# Patient Record
Sex: FEMALE | Race: WHITE | NOT HISPANIC OR LATINO
[De-identification: names, ages, dates, MRNs, and addresses within clinical notes are randomized per-mention and may not be internally consistent; named-entity substitution may affect disease eponyms.]

---

## 2018-11-22 ENCOUNTER — TRANSCRIPTION ENCOUNTER (OUTPATIENT)
Age: 43
End: 2018-11-22

## 2019-04-03 ENCOUNTER — APPOINTMENT (OUTPATIENT)
Dept: BREAST CENTER | Facility: CLINIC | Age: 44
End: 2019-04-03
Payer: COMMERCIAL

## 2019-04-03 VITALS
BODY MASS INDEX: 24.29 KG/M2 | DIASTOLIC BLOOD PRESSURE: 68 MMHG | SYSTOLIC BLOOD PRESSURE: 95 MMHG | HEIGHT: 62 IN | WEIGHT: 132 LBS | HEART RATE: 65 BPM

## 2019-04-03 DIAGNOSIS — Z78.9 OTHER SPECIFIED HEALTH STATUS: ICD-10-CM

## 2019-04-03 DIAGNOSIS — Z87.898 PERSONAL HISTORY OF OTHER SPECIFIED CONDITIONS: ICD-10-CM

## 2019-04-03 DIAGNOSIS — Z87.410 PERSONAL HISTORY OF CERVICAL DYSPLASIA: ICD-10-CM

## 2019-04-03 DIAGNOSIS — Z87.891 PERSONAL HISTORY OF NICOTINE DEPENDENCE: ICD-10-CM

## 2019-04-03 DIAGNOSIS — Z80.1 FAMILY HISTORY OF MALIGNANT NEOPLASM OF TRACHEA, BRONCHUS AND LUNG: ICD-10-CM

## 2019-04-03 PROCEDURE — 99215 OFFICE O/P EST HI 40 MIN: CPT

## 2019-04-07 NOTE — PHYSICAL EXAM
[Normocephalic] : normocephalic [Atraumatic] : atraumatic [Supple] : supple [No Supraclavicular Adenopathy] : no supraclavicular adenopathy [Examined in the supine and seated position] : examined in the supine and seated position [No dominant masses] : no dominant masses in right breast  [No dominant masses] : no dominant masses left breast [No Nipple Retraction] : no left nipple retraction [No Nipple Discharge] : no left nipple discharge [No Axillary Lymphadenopathy] : no left axillary lymphadenopathy [No Edema] : no edema [No Rashes] : no rashes [No Ulceration] : no ulceration [de-identified] : Healed periareolar incision

## 2019-04-07 NOTE — ASSESSMENT
[FreeTextEntry1] : This is a 43-year-old high-risk female\par She will have a left breast 3:00 ultrasound-guided biopsy\par She is due for bilateral breast MRI April 2019\par Plan bilateral mammogram and ultrasound October 2019\par If pathology is benign and concordant in followup in October after screening imaging\par We reviewed risk reduction strategies including maintaining a BMI <25, limiting red meat intake and alcoholic beverages to 3 per week and exercise (150 min/ week low intensity or 75 min/week high intensity). And maintaining a normal vitamin D level.\par \par She knows to call or return sooner should any concerns or questions arise.\par

## 2019-04-07 NOTE — PHYSICAL EXAM
[Normocephalic] : normocephalic [Atraumatic] : atraumatic [Supple] : supple [No Supraclavicular Adenopathy] : no supraclavicular adenopathy [Examined in the supine and seated position] : examined in the supine and seated position [No dominant masses] : no dominant masses in right breast  [No dominant masses] : no dominant masses left breast [No Nipple Retraction] : no left nipple retraction [No Nipple Discharge] : no left nipple discharge [No Axillary Lymphadenopathy] : no left axillary lymphadenopathy [No Edema] : no edema [No Rashes] : no rashes [No Ulceration] : no ulceration [de-identified] : Healed periareolar incision

## 2019-04-07 NOTE — HISTORY OF PRESENT ILLNESS
[FreeTextEntry1] :  This is a 42 year old female followed for high risk. She is s/p left stereotactic biopsy on 10/20/2016 done for increasing microcalcifications of the left breast lower outer quadrant. Pathology showed adenosis, sclerosing adenosis and involuting adenosis with scattered luminal calcifications. Path was found to be concordant and a f/u 6 month mammogram was recommended.  She is s/p left excisional biopsy in 11/2015. Path showed PASH and benign breast tissue. She has a history of bilateral excisional biopsies for fibroadenomas and a additional two core biopsies  of the left breast in 2002 and 2013.  Both areas were benign. She stated she first felt this lump on 9/2/15 and had negative imaging. She then had a breast MRI which showed an area at 10:00 of "suspicious enhancement" for which targeted ultrasound was recommended. This was done at [place] and a complicated cyst was found to correlate with the palpable finding and she was told to follow up in 6 months for ultrasound. On further workup, at left breast 10:00 2cm FN a circumscribed hypoechoic lesion correlates with the enhancing lesion seen on MRI at 10:00. IN addition at 7-8:00 in left breast 3cm FN adjacent to where the patient felt a lump a hypoechoic lesion with indistinct margins was seen and biopsy was recommended. The patient is s/p left 10:00 N2 USGBx 10/22/15, pathology shows nodular adenosis, with no atypia nor malignancy. At left -8:00 N3 pathology shows cystic change with associated papillary apocrine hyperplasia, no atypia nor malignancy. Patient is s/p left EBBx 11/12/15 for a palpable mass that was not seen on ultrasound. 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 \par \par Patient is s/p hip labrum tear repair on 09/29/2017. Patient presents today for a new right breast palpable mass. Patient states she felt on 01/03/2018. \par \par She does not SBE. \par She has not noticed a change in her breast or a breast lump on the right.\par She has not noticed a change in her nipple or nipple area.\par She has not noticed a change in the skin of the breast.\par She is not experiencing nipple discharge.\par She is not experiencing breast pain.\par She has not noticed a lump or lymph node under the armpit. \par \par BREAST CANCER RISK FACTORS\par Menarche: 9\par Date of LMP: 12/18/2017\par Menopause: pre\par Grav: 2  Para:  2\par Age at first live birth: 32\par Nursed: no\par Hysterectomy: no\par Oophorectomy: no\par OCP: yes\par HRT: no\par Last pap/pelvic exam: 05/2017 WNL\par Related family history: no\par Ashkenazi: no\par Mastery: BRCAPRO 10.7%, TCv6 10.8%, TCv7 26.3%, Sarah 28.8%\par BRCA testing: no\par Bra size:  36B\par \par Last mammogram:    09/12/2017        Location: [place]\par Report reviewed.                                 Images reviewed on PACS.\par Results: Birads 2\par Waxing and waning cysts clusters. Heterogeneously dense breast. No evidence of malignancy. \par \par \par Last ultrasound:   09/12/2017        Location: [place]\par Report reviewed.                                 Images reviewed on PACS.\par Results: Birads 2\par Waxing and waning cysts clusters. Heterogeneously dense breast. No evidence of malignancy. \par \par \par Last MRI:      11/22/2016                           Location: [facility] \par Report reviewed.                                     Images reviewed on PACS.\par Results: Birads 2\par No evidence of malignancy

## 2019-04-07 NOTE — HISTORY OF PRESENT ILLNESS
[FreeTextEntry1] :  This is a 42 year old female followed for high risk. She is s/p left stereotactic biopsy on 10/20/2016 done for increasing microcalcifications of the left breast lower outer quadrant. Pathology showed adenosis, sclerosing adenosis and involuting adenosis with scattered luminal calcifications. Path was found to be concordant and a f/u 6 month mammogram was recommended.  She is s/p left excisional biopsy in 11/2015. Path showed PASH and benign breast tissue. She has a history of bilateral excisional biopsies for fibroadenomas and a additional two core biopsies  of the left breast in 2002 and 2013.  Both areas were benign. She stated she first felt this lump on 9/2/15 and had negative imaging. She then had a breast MRI which showed an area at 10:00 of "suspicious enhancement" for which targeted ultrasound was recommended. This was done at [place] and a complicated cyst was found to correlate with the palpable finding and she was told to follow up in 6 months for ultrasound. On further workup, at left breast 10:00 2cm FN a circumscribed hypoechoic lesion correlates with the enhancing lesion seen on MRI at 10:00. IN addition at 7-8:00 in left breast 3cm FN adjacent to where the patient felt a lump a hypoechoic lesion with indistinct margins was seen and biopsy was recommended. The patient is s/p left 10:00 N2 USGBx 10/22/15, pathology shows nodular adenosis, with no atypia nor malignancy. At left -8:00 N3 pathology shows cystic change with associated papillary apocrine hyperplasia, no atypia nor malignancy. Patient is s/p left EBBx 11/12/15 for a palpable mass that was not seen on ultrasound. 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 \par \par Patient is s/p hip labrum tear repair on 09/29/2017. Patient presents today for a new right breast palpable mass. Patient states she felt on 01/03/2018. \par \par She does not SBE. \par She has not noticed a change in her breast or a breast lump on the right.\par She has not noticed a change in her nipple or nipple area.\par She has not noticed a change in the skin of the breast.\par She is not experiencing nipple discharge.\par She is not experiencing breast pain.\par She has not noticed a lump or lymph node under the armpit. \par \par BREAST CANCER RISK FACTORS\par Menarche: 9\par Date of LMP: 12/18/2017\par Menopause: pre\par Grav: 2  Para:  2\par Age at first live birth: 32\par Nursed: no\par Hysterectomy: no\par Oophorectomy: no\par OCP: yes\par HRT: no\par Last pap/pelvic exam: 05/2017 WNL\par Related family history: no\par Ashkenazi: no\par Mastery: BRCAPRO 10.7%, TCv6 10.8%, TCv7 26.3%, Sarah 28.8%\par BRCA testing: no\par Bra size:  36B\par \par Last mammogram:    09/12/2017        Location: [place]\par Report reviewed.                                 Images reviewed on PACS.\par Results: Birads 2\par Waxing and waning cysts clusters. Heterogeneously dense breast. No evidence of malignancy. \par \par \par Last ultrasound:   09/12/2017        Location: [place]\par Report reviewed.                                 Images reviewed on PACS.\par Results: Birads 2\par Waxing and waning cysts clusters. Heterogeneously dense breast. No evidence of malignancy. \par \par \par Last MRI:      11/22/2016                           Location: [facility] [place] \par Report reviewed.                                     Images reviewed on PACS.\par Results: Birads 2\par No evidence of malignancy

## 2019-04-07 NOTE — REVIEW OF SYSTEMS
[Fever] : fever [Chills] : chills [Recent Weight Loss (___ Lbs)] : recent [unfilled] ~Ulb weight loss [Night Sweats] : night sweats [Abdominal Pain] : abdominal pain [Vomiting] : vomiting [Joint Pain] : joint pain [Lower Back Pain] : lower back pain [Hot Flashes] : hot flashes [Pain During [place]] : pain during intercourse [Easy Bruising] : a tendency for easy bruising [Negative] : Psychiatric [FreeTextEntry7] : loss of appetite [FreeTextEntry2] : abnormal pap smear

## 2019-04-07 NOTE — REVIEW OF SYSTEMS
[Fever] : fever [Chills] : chills [Recent Weight Loss (___ Lbs)] : recent [unfilled] ~Ulb weight loss [Night Sweats] : night sweats [Abdominal Pain] : abdominal pain [Vomiting] : vomiting [Joint Pain] : joint pain [Lower Back Pain] : lower back pain [Hot Flashes] : hot flashes [Pain During [address]] : pain during intercourse [Easy Bruising] : a tendency for easy bruising [Negative] : Psychiatric [FreeTextEntry7] : loss of appetite [FreeTextEntry2] : abnormal pap smear

## 2019-04-08 ENCOUNTER — APPOINTMENT (OUTPATIENT)
Dept: BREAST CENTER | Facility: CLINIC | Age: 44
End: 2019-04-08

## 2019-04-12 ENCOUNTER — RESULT REVIEW (OUTPATIENT)
Age: 44
End: 2019-04-12

## 2019-04-25 ENCOUNTER — CHART COPY (OUTPATIENT)
Age: 44
End: 2019-04-25

## 2019-04-26 ENCOUNTER — CLINICAL ADVICE (OUTPATIENT)
Age: 44
End: 2019-04-26

## 2019-05-08 ENCOUNTER — CLINICAL ADVICE (OUTPATIENT)
Age: 44
End: 2019-05-08

## 2019-05-09 NOTE — REVIEW OF SYSTEMS
[Fever] : fever [Chills] : chills [Recent Weight Loss (___ Lbs)] : recent [unfilled] ~Ulb weight loss [Night Sweats] : night sweats [Abdominal Pain] : abdominal pain [Vomiting] : vomiting [Joint Pain] : joint pain [Lower Back Pain] : lower back pain [Hot Flashes] : hot flashes [Pain During [place]] : pain during intercourse [Easy Bruising] : a tendency for easy bruising [Negative] : Psychiatric [FreeTextEntry2] : abnormal pap smear [FreeTextEntry7] : loss of appetite

## 2019-05-09 NOTE — HISTORY OF PRESENT ILLNESS
[FreeTextEntry1] :  This is a 43 year old female followed for high risk. She is s/p left stereotactic biopsy on 10/20/2016 done for increasing microcalcifications of the left breast lower outer quadrant. Pathology showed adenosis, sclerosing adenosis and involuting adenosis with scattered luminal calcifications. Path was found to be concordant and a f/u 6 month mammogram was recommended.  She is s/p left excisional biopsy in 11/2015. Path showed PASH and benign breast tissue. She has a history of bilateral excisional biopsies for fibroadenomas and a additional two core biopsies  of the left breast in 2002 and 2013.  Both areas were benign. She stated she first felt this lump on 9/2/15 and had negative imaging. She then had a breast MRI which showed an area at 10:00 of "suspicious enhancement" for which targeted ultrasound was recommended. This was done at [place] and a complicated cyst was found to correlate with the palpable finding and she was told to follow up in 6 months for ultrasound. On further workup, at left breast 10:00 2cm FN a circumscribed hypoechoic lesion correlates with the enhancing lesion seen on MRI at 10:00. IN addition at 7-8:00 in left breast 3cm FN adjacent to where the patient felt a lump a hypoechoic lesion with indistinct margins was seen and biopsy was recommended. The patient is s/p left 10:00 N2 USGBx 10/22/15, pathology shows nodular adenosis, with no atypia nor malignancy. At left -8:00 N3 pathology shows cystic change with associated papillary apocrine hyperplasia, no atypia nor malignancy. Patient is s/p left EBBx 11/12/15 for a palpable mass that was not seen on ultrasound. 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 Patient is s/p hip labrum tear repair on 09/29/2017. \par \par Her father passed away in 2017. Her 7-year-old son was recently diagnosed with a rare neurologic condition. She is feeling under a lot of stress. She had breast imaging today. And a biopsy is being recommended for a new hypoechoic lesion of the left breast. She will also be due for screening breast MRI in April.\par She does not SBE. \par She has not noticed a change in her breast or a breast lump on the right.\par She has not noticed a change in her nipple or nipple area.\par She has not noticed a change in the skin of the breast.\par She is not experiencing nipple discharge.\par She is not experiencing breast pain. She is experiencing tenderness to the touch with her left breast\par She has not noticed a lump or lymph node under the armpit. \par \par BREAST CANCER RISK FACTORS\par Menarche: 9\par Date of LMP: 2/15/2019\par Menopause: pre\par Grav: 2  Para:  2\par Age at first live birth: 32\par Nursed: no\par Hysterectomy: yes 3/5/2019\par Oophorectomy: no\par OCP: yes\par HRT: no\par Last pap/pelvic exam: 11/2018. abnormal\par Related family history: no\par Ashkenazi: no\par Mastery: BRCAPRO 11.2%, TCv6 10.2%, TCv7 25.0%, Sarah 22.0%, [facility] is not applicable. \par BRCA testing: no\par Bra size:  36B\par \par Last mammogram:    09/12/2017        Location: [place]\par Report reviewed.                                 Images reviewed on PACS.\par Results: Birads 2\par Waxing and waning cysts clusters. Heterogeneously dense breast. No evidence of malignancy. \par \par \par Last ultrasound:   4/3/2019(left)        Location: [place]\par Report reviewed.                                 Images reviewed on PACS.\par Results: Birads 4a Left breast 3:00 retroareolar there is a hypoechoic lesion that is not significantly changed in size but there is new color flow anteriorly. Recommend ultrasound-guided biopsy BI-RADS 4A\par \par \par Last MRI:      2018                           Location: [facility] \par Report reviewed.                                     Images reviewed on PACS.\par Results: Birads 2\par No evidence of malignancy

## 2019-05-09 NOTE — PHYSICAL EXAM
[Normocephalic] : normocephalic [Atraumatic] : atraumatic [Supple] : supple [No Supraclavicular Adenopathy] : no supraclavicular adenopathy [Examined in the supine and seated position] : examined in the supine and seated position [No dominant masses] : no dominant masses left breast [No dominant masses] : no dominant masses in right breast  [No Nipple Retraction] : no left nipple retraction [No Nipple Discharge] : no left nipple discharge [No Axillary Lymphadenopathy] : no left axillary lymphadenopathy [No Edema] : no edema [No Rashes] : no rashes [No Ulceration] : no ulceration [de-identified] : Healed periareolar incision

## 2019-05-10 ENCOUNTER — APPOINTMENT (OUTPATIENT)
Dept: BREAST CENTER | Facility: CLINIC | Age: 44
End: 2019-05-10
Payer: COMMERCIAL

## 2019-05-10 VITALS
HEART RATE: 72 BPM | BODY MASS INDEX: 23.74 KG/M2 | HEIGHT: 62 IN | WEIGHT: 129 LBS | DIASTOLIC BLOOD PRESSURE: 69 MMHG | SYSTOLIC BLOOD PRESSURE: 98 MMHG

## 2019-05-10 PROCEDURE — 99215 OFFICE O/P EST HI 40 MIN: CPT

## 2019-05-10 NOTE — HISTORY OF PRESENT ILLNESS
[FreeTextEntry1] : This is a 43 year old female followed for high risk. She is s/p left stereotactic biopsy on 10/20/2016 done for increasing microcalcifications of the left breast lower outer quadrant. Pathology showed adenosis, sclerosing adenosis and involuting adenosis with scattered luminal calcifications. Path was found to be concordant and a f/u 6 month mammogram was recommended.  She is s/p left excisional biopsy in 11/2015. Path showed PASH and benign breast tissue. She has a history of bilateral excisional biopsies for fibroadenomas and a additional two core biopsies  of the left breast in 2002 and 2013.  Both areas were benign. She stated she first felt this lump on 9/2/15 and had negative imaging. She then had a breast MRI which showed an area at 10:00 of "suspicious enhancement" for which targeted ultrasound was recommended. This was done at [place] and a complicated cyst was found to correlate with the palpable finding and she was told to follow up in 6 months for ultrasound. On further workup, at left breast 10:00 2cm FN a circumscribed hypoechoic lesion correlates with the enhancing lesion seen on MRI at 10:00. IN addition at 7-8:00 in left breast 3cm FN adjacent to where the patient felt a lump a hypoechoic lesion with indistinct margins was seen and biopsy was recommended. The patient is s/p left 10:00 N2 USGBx 10/22/15, pathology shows nodular adenosis, with no atypia nor malignancy. At left -8:00 N3 pathology shows cystic change with associated papillary apocrine hyperplasia, no atypia nor malignancy. Patient is s/p left EBBx 11/12/15 for a palpable mass that was not seen on ultrasound. 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 \par \par Patient is s/p hip labrum tear repair on 09/29/2017. She is status post hysterectomy. On 4/9/19 she underwent left breast ultrasound-guided biopsy pathology showed benign intraductal papilloma with you D. age. Transected on one side. No atypia no malignancy.She then had screening breast MRI. On 4/25/19 she had left breast 10:00 ultrasound-guided biopsy which revealed benign results. And this was on an area as seen on her MRI with enhancement.On 5/6/19 she underwent right breast 7:00 MRI guided biopsy which had benign findings. And PAS H. and MRI biopsy right breast in upper inner quadrant which showed benign breast tissue and PAS H.\par \par \par She does not SBE. \par She has noticed a change in her right breast or a breast lump on the right. Painful bruise on right breast.\par She has not noticed a change in her nipple or nipple area.\par She has noticed a change in the skin painful bruising of the right breast.\par She is not experiencing nipple discharge.\par She is experiencing right breast pain.\par She has not noticed a lump or lymph node under the armpit. \par \par BREAST CANCER RISK FACTORS\par Menarche: 9\par Date of LMP: 2/15/2019\par Menopause: pre\par Grav: 2  Para:  2\par Age at first live birth: 32\par Nursed: no\par Hysterectomy: yes 3/5/2019\par Oophorectomy: no\par OCP: yes\par HRT: no\par Last pap/pelvic exam: 4/2019 WNL.\par Related family history: no\par Ashkenazi: no\par Mastery: BRCAPRO 10.7%, TCv6 10.8%, TCv7 26.3%, Sarah 28.8%\par BRCA testing: no\par Bra size:  36B\par \par Last mammogram:    4/25/2019        Location: [place]\par Report reviewed.                                 Images reviewed on PACS.\par Results: Birads 4\par \par \par Last ultrasound:   04/25/2019        Location: [place]\par Report reviewed.                                 Images reviewed on PACS.\par Results: Birads 4\par \par \par Last MRI:   4/23/2019                           Location: [facility] \par Report reviewed.                                     Images reviewed on PACS.\par Results: Birads 4aLeft breast 3:00 postbiopsy change and small enhancing mass with washout kinetic posterior and lateral to the marker. May repair present residual papilloma. Left breast 10:00 there is an enhancing mass with washout kinetic. 7:00 in the right breast there is a nodular rim enhancing mass with washout kinetic. MRI directed ultrasound is recommended with possible biopsy or MRI biopsy.\par

## 2019-05-10 NOTE — PHYSICAL EXAM
[Atraumatic] : atraumatic [Normocephalic] : normocephalic [Supple] : supple [No Supraclavicular Adenopathy] : no supraclavicular adenopathy [Examined in the supine and seated position] : examined in the supine and seated position [Symmetrical] : symmetrical [No dominant masses] : no dominant masses in right breast  [No dominant masses] : no dominant masses left breast [No Nipple Retraction] : no left nipple retraction [No Nipple Discharge] : no left nipple discharge [No Axillary Lymphadenopathy] : no left axillary lymphadenopathy [No Edema] : no edema [No Rashes] : no rashes [No Ulceration] : no ulceration [de-identified] : uiq BRUISING  with associate hardness as expected; Steri-Strips removed

## 2019-05-10 NOTE — ASSESSMENT
[FreeTextEntry1] : left breast 3:00 papilloma\par right breast benign pathology\par arnica topically\par genetic testing referral\par She will have genetic testing and then decide what surgery she would like she will need left breast excisional biopsy 3:00 for her biopsy-proven papilloma.\par She knows to call or return sooner should any concerns or questions arise.\par

## 2019-05-31 ENCOUNTER — APPOINTMENT (OUTPATIENT)
Dept: BREAST CENTER | Facility: CLINIC | Age: 44
End: 2019-05-31
Payer: COMMERCIAL

## 2019-05-31 VITALS
BODY MASS INDEX: 23.55 KG/M2 | DIASTOLIC BLOOD PRESSURE: 67 MMHG | HEART RATE: 63 BPM | WEIGHT: 128 LBS | HEIGHT: 62 IN | SYSTOLIC BLOOD PRESSURE: 107 MMHG

## 2019-05-31 PROCEDURE — 99215 OFFICE O/P EST HI 40 MIN: CPT

## 2019-06-06 ENCOUNTER — APPOINTMENT (OUTPATIENT)
Dept: BREAST CENTER | Facility: HOSPITAL | Age: 44
End: 2019-06-06
Payer: COMMERCIAL

## 2019-06-06 PROCEDURE — 76098 X-RAY EXAM SURGICAL SPECIMEN: CPT | Mod: 26,59

## 2019-06-06 PROCEDURE — 14001 TIS TRNFR TRUNK 10.1-30SQCM: CPT

## 2019-06-06 PROCEDURE — 19125 EXCISION BREAST LESION: CPT | Mod: LT,59

## 2019-06-10 ENCOUNTER — TRANSCRIPTION ENCOUNTER (OUTPATIENT)
Age: 44
End: 2019-06-10

## 2019-06-14 ENCOUNTER — APPOINTMENT (OUTPATIENT)
Dept: BREAST CENTER | Facility: CLINIC | Age: 44
End: 2019-06-14
Payer: COMMERCIAL

## 2019-06-14 PROCEDURE — 99024 POSTOP FOLLOW-UP VISIT: CPT

## 2019-06-14 NOTE — HISTORY OF PRESENT ILLNESS
[FreeTextEntry1] : This is a 43 year old female followed for high risk. She is s/p left stereotactic biopsy on 10/20/2016 done for increasing microcalcifications of the left breast lower outer quadrant. Pathology showed adenosis, sclerosing adenosis and involuting adenosis with scattered luminal calcifications. Path was found to be concordant and a f/u 6 month mammogram was recommended.  She is s/p left excisional biopsy in 11/2015. Path showed PASH and benign breast tissue. She has a history of bilateral excisional biopsies for fibroadenomas and a additional two core biopsies  of the left breast in 2002 and 2013.  Both areas were benign. She stated she first felt this lump on 9/2/15 and had negative imaging. She then had a breast MRI which showed an area at 10:00 of "suspicious enhancement" for which targeted ultrasound was recommended. This was done at [place] and a complicated cyst was found to correlate with the palpable finding and she was told to follow up in 6 months for ultrasound. On further workup, at left breast 10:00 2cm FN a circumscribed hypoechoic lesion correlates with the enhancing lesion seen on MRI at 10:00. IN addition at 7-8:00 in left breast 3cm FN adjacent to where the patient felt a lump a hypoechoic lesion with indistinct margins was seen and biopsy was recommended. The patient is s/p left 10:00 N2 USGBx 10/22/15, pathology shows nodular adenosis, with no atypia nor malignancy. At left -8:00 N3 pathology shows cystic change with associated papillary apocrine hyperplasia, no atypia nor malignancy. Patient is s/p left EBBx 11/12/15 for a palpable mass that was not seen on ultrasound. 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 \par \par Patient is s/p hip labrum tear repair on 09/29/2017. She is status post hysterectomy. On 4/9/19 she underwent left breast ultrasound-guided biopsy pathology showed benign intraductal papilloma with you D. age. Transected on one side. No atypia no malignancy.She then had screening breast MRI. On 4/25/19 she had left breast 10:00 ultrasound-guided biopsy which revealed benign results. And this was on an area as seen on her MRI with enhancement.On 5/6/19 she underwent right breast 7:00 MRI guided biopsy which had benign findings. And PAS H. and MRI biopsy right breast in upper inner quadrant which showed benign breast tissue and PASH.\par \par C/o right breast burning, lump, denies trauma and has been doing warm washcloths with some relief. \par \par Status post left breast 3:00 excisional biopsy 6/6/19 pathology shows benign breast tissue with usual ductal hyperplasia, biopsy change present, no residual papilloma identified.She has no  breast complaints today.\par \par \par She does not SBE. \par She has noticed a change in her right breast or a breast lump on the right. Painful bruise on right breast.\par She has not noticed a change in her nipple or nipple area.\par She has noticed a change in the skin painful bruising of the right breast.\par She is not experiencing nipple discharge.\par She is experiencing right breast pain.\par She has not noticed a lump or lymph node under the armpit. \par \par BREAST CANCER RISK FACTORS\par Menarche: 9\par Date of LMP: 2/15/2019\par Menopause: pre\par Grav: 2  Para:  2\par Age at first live birth: 32\par Nursed: no\par Hysterectomy: yes 3/5/2019\par Oophorectomy: no\par OCP: yes\par HRT: no\par Last pap/pelvic exam: 4/2019 WNL.\par Related family history: no\par Ashkenazi: no\par Mastery: BRCAPRO 10.7%, TCv6 10.8%, TCv7 26.3%, Sarah 28.8%\par BRCA testing: yes negative\par Bra size:  36B\par \par Last mammogram:    4/25/2019        Location: [place]\par Report reviewed.                                 Images reviewed on PACS.\par Results: Birads 4\par \par \par Last ultrasound:   04/25/2019        Location: [place]\par Report reviewed.                                 Images reviewed on PACS.\par Results: Birads 4\par \par \par Last MRI:   4/23/2019                           Location: [facility] \par Report reviewed.                                     Images reviewed on PACS.\par Results: Birads 4aLeft breast 3:00 postbiopsy change and small enhancing mass with washout kinetic posterior and lateral to the marker. May repair present residual papilloma. Left breast 10:00 there is an enhancing mass with washout kinetic. 7:00 in the right breast there is a nodular rim enhancing mass with washout kinetic. MRI directed ultrasound is recommended with possible biopsy or MRI biopsy.\par

## 2019-06-14 NOTE — ASSESSMENT
[FreeTextEntry1] : doing well post op, pathology reviewed, copy given\par f/u1 month to ensure dermabond removal\par plan mg/sono OCT 2019\par plan left us 10N2 OCT 2019\par plan 6 month follow up MRI NOV 2019\par She knows to call or return sooner should any concerns or questions arise.\par

## 2019-06-14 NOTE — HISTORY OF PRESENT ILLNESS
[FreeTextEntry1] : This is a 43 year old female followed for high risk. She is s/p left stereotactic biopsy on 10/20/2016 done for increasing microcalcifications of the left breast lower outer quadrant. Pathology showed adenosis, sclerosing adenosis and involuting adenosis with scattered luminal calcifications. Path was found to be concordant and a f/u 6 month mammogram was recommended.  She is s/p left excisional biopsy in 11/2015. Path showed PASH and benign breast tissue. She has a history of bilateral excisional biopsies for fibroadenomas and a additional two core biopsies  of the left breast in 2002 and 2013.  Both areas were benign. She stated she first felt this lump on 9/2/15 and had negative imaging. She then had a breast MRI which showed an area at 10:00 of "suspicious enhancement" for which targeted ultrasound was recommended. This was done at [place] and a complicated cyst was found to correlate with the palpable finding and she was told to follow up in 6 months for ultrasound. On further workup, at left breast 10:00 2cm FN a circumscribed hypoechoic lesion correlates with the enhancing lesion seen on MRI at 10:00. IN addition at 7-8:00 in left breast 3cm FN adjacent to where the patient felt a lump a hypoechoic lesion with indistinct margins was seen and biopsy was recommended. The patient is s/p left 10:00 N2 USGBx 10/22/15, pathology shows nodular adenosis, with no atypia nor malignancy. At left -8:00 N3 pathology shows cystic change with associated papillary apocrine hyperplasia, no atypia nor malignancy. Patient is s/p left EBBx 11/12/15 for a palpable mass that was not seen on ultrasound. 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 \par \par Patient is s/p hip labrum tear repair on 09/29/2017. She is status post hysterectomy. On 4/9/19 she underwent left breast ultrasound-guided biopsy pathology showed benign intraductal papilloma with you D. age. Transected on one side. No atypia no malignancy.She then had screening breast MRI. On 4/25/19 she had left breast 10:00 ultrasound-guided biopsy which revealed benign results. And this was on an area as seen on her MRI with enhancement.On 5/6/19 she underwent right breast 7:00 MRI guided biopsy which had benign findings. And PAS H. and MRI biopsy right breast in upper inner quadrant which showed benign breast tissue and PASH.\par \par C/o right breast burning, lump, denies trauma and has been doing warm washcloths with some relief. \par \par Status post left breast 3:00 excisional biopsy 6/6/19 pathology shows benign breast tissue with usual ductal hyperplasia, biopsy change present, no residual papilloma identified.She has no  breast complaints today.\par \par \par She does not SBE. \par She has noticed a change in her right breast or a breast lump on the right. Painful bruise on right breast.\par She has not noticed a change in her nipple or nipple area.\par She has noticed a change in the skin painful bruising of the right breast.\par She is not experiencing nipple discharge.\par She is experiencing right breast pain.\par She has not noticed a lump or lymph node under the armpit. \par \par BREAST CANCER RISK FACTORS\par Menarche: 9\par Date of LMP: 2/15/2019\par Menopause: pre\par Grav: 2  Para:  2\par Age at first live birth: 32\par Nursed: no\par Hysterectomy: yes 3/5/2019\par Oophorectomy: no\par OCP: yes\par HRT: no\par Last pap/pelvic exam: 4/2019 WNL.\par Related family history: no\par Ashkenazi: no\par Mastery: BRCAPRO 10.7%, TCv6 10.8%, TCv7 26.3%, Sarah 28.8%\par BRCA testing: yes negative\par Bra size:  36B\par \par Last mammogram:    4/25/2019        Location: [place]\par Report reviewed.                                 Images reviewed on PACS.\par Results: Birads 4\par \par \par Last ultrasound:   04/25/2019        Location: [place]\par Report reviewed.                                 Images reviewed on PACS.\par Results: Birads 4\par \par \par Last MRI:   4/23/2019                           Location: [facility] \par Report reviewed.                                     Images reviewed on PACS.\par Results: Birads 4aLeft breast 3:00 postbiopsy change and small enhancing mass with washout kinetic posterior and lateral to the marker. May repair present residual papilloma. Left breast 10:00 there is an enhancing mass with washout kinetic. 7:00 in the right breast there is a nodular rim enhancing mass with washout kinetic. MRI directed ultrasound is recommended with possible biopsy or MRI biopsy.\par

## 2019-06-14 NOTE — PHYSICAL EXAM
[Atraumatic] : atraumatic [Normocephalic] : normocephalic [Supple] : supple [No Supraclavicular Adenopathy] : no supraclavicular adenopathy [Examined in the supine and seated position] : examined in the supine and seated position [Symmetrical] : symmetrical [No dominant masses] : no dominant masses in right breast  [No Nipple Retraction] : no right nipple retraction [No dominant masses] : no dominant masses left breast [No Nipple Discharge] : no right nipple discharge [No Axillary Lymphadenopathy] : no left axillary lymphadenopathy [No Edema] : no edema [No Rashes] : no rashes [No Ulceration] : no ulceration [de-identified] : right breast UOQ hematoma palpable in area of patient's concern, no nipple abnormalities improved since prior [de-identified] : healed periareolar incision , dermabond intact, no collections

## 2019-06-14 NOTE — ASSESSMENT
[FreeTextEntry1] : doing well\par path reviewed, she has received a copy \par f/u  1 month to ensure dermabond removed\par plan bl mg/sono OCT 2019\par f/u MRI NOV 2019\par left targeted us 10N2\par She knows to call or return sooner should any concerns or questions arise.\par

## 2019-06-14 NOTE — PHYSICAL EXAM
[Normocephalic] : normocephalic [Atraumatic] : atraumatic [Supple] : supple [No Supraclavicular Adenopathy] : no supraclavicular adenopathy [Examined in the supine and seated position] : examined in the supine and seated position [Symmetrical] : symmetrical [No dominant masses] : no dominant masses in right breast  [No dominant masses] : no dominant masses left breast [No Nipple Retraction] : no left nipple retraction [No Nipple Discharge] : no left nipple discharge [No Axillary Lymphadenopathy] : no left axillary lymphadenopathy [No Edema] : no edema [No Rashes] : no rashes [No Ulceration] : no ulceration [de-identified] : right breast UOQ hematoma palpable in area of patient's concern, no nipple abnormalities improved since prior [de-identified] : healed periareolar incision , dermabond intact, no collections

## 2019-07-10 ENCOUNTER — APPOINTMENT (OUTPATIENT)
Dept: BREAST CENTER | Facility: CLINIC | Age: 44
End: 2019-07-10

## 2019-07-17 ENCOUNTER — APPOINTMENT (OUTPATIENT)
Dept: BREAST CENTER | Facility: CLINIC | Age: 44
End: 2019-07-17
Payer: COMMERCIAL

## 2019-07-17 PROCEDURE — 99024 POSTOP FOLLOW-UP VISIT: CPT

## 2019-07-17 NOTE — HISTORY OF PRESENT ILLNESS
[FreeTextEntry1] : This is a 43 year old female followed for high risk. She is s/p left stereotactic biopsy on 10/20/2016 done for increasing microcalcifications of the left breast lower outer quadrant. Pathology showed adenosis, sclerosing adenosis and involuting adenosis with scattered luminal calcifications. Path was found to be concordant and a f/u 6 month mammogram was recommended.  She is s/p left excisional biopsy in 11/2015. Path showed PASH and benign breast tissue. She has a history of bilateral excisional biopsies for fibroadenomas and a additional two core biopsies  of the left breast in 2002 and 2013.  Both areas were benign. She stated she first felt this lump on 9/2/15 and had negative imaging. She then had a breast MRI which showed an area at 10:00 of "suspicious enhancement" for which targeted ultrasound was recommended. This was done at [place] and a complicated cyst was found to correlate with the palpable finding and she was told to follow up in 6 months for ultrasound. On further workup, at left breast 10:00 2cm FN a circumscribed hypoechoic lesion correlates with the enhancing lesion seen on MRI at 10:00. IN addition at 7-8:00 in left breast 3cm FN adjacent to where the patient felt a lump a hypoechoic lesion with indistinct margins was seen and biopsy was recommended. The patient is s/p left 10:00 N2 USGBx 10/22/15, pathology shows nodular adenosis, with no atypia nor malignancy. At left -8:00 N3 pathology shows cystic change with associated papillary apocrine hyperplasia, no atypia nor malignancy. Patient is s/p left EBBx 11/12/15 for a palpable mass that was not seen on ultrasound. 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 \par \par Patient is s/p hip labrum tear repair on 09/29/2017. She is status post hysterectomy. On 4/9/19 she underwent left breast ultrasound-guided biopsy pathology showed benign intraductal papilloma with you D. age. Transected on one side. No atypia no malignancy.She then had screening breast MRI. On 4/25/19 she had left breast 10:00 ultrasound-guided biopsy which revealed benign results. And this was on an area as seen on her MRI with enhancement.On 5/6/19 she underwent right breast 7:00 MRI guided biopsy which had benign findings. And PAS H. and MRI biopsy right breast in upper inner quadrant which showed benign breast tissue and PASH.\par \par C/o right breast burning, lump, denies trauma and has been doing warm washcloths with some relief. \par \par Status post left breast 3:00 excisional biopsy 6/6/19 pathology shows benign breast tissue with usual ductal hyperplasia, biopsy change present, no residual papilloma identified.She has no  breast complaints today.\par \par \par She does not SBE. \par She has noticed a change in her right breast or a breast lump on the right. Painful bruise on right breast.\par She has not noticed a change in her nipple or nipple area.\par She has noticed a change in the skin painful bruising of the right breast.\par She is not experiencing nipple discharge.\par She is experiencing right breast pain.\par She has not noticed a lump or lymph node under the armpit. \par \par BREAST CANCER RISK FACTORS\par Menarche: 9\par Date of LMP: 2/15/2019\par Menopause: pre\par Grav: 2  Para:  2\par Age at first live birth: 32\par Nursed: no\par Hysterectomy: yes 3/5/2019\par Oophorectomy: no\par OCP: yes\par HRT: no\par Last pap/pelvic exam: 4/2019 WNL.\par Related family history: no\par Ashkenazi: no\par Mastery: BRCAPRO 10.7%, TCv6 10.8%, TCv7 26.3%, Sarah 28.8%\par BRCA testing: yes negative\par Bra size:  36B\par \par Last mammogram:    4/25/2019        Location: [place]\par Report reviewed.                                 Images reviewed on PACS.\par Results: Birads 4\par \par \par Last ultrasound:   04/25/2019        Location: [place]\par Report reviewed.                                 Images reviewed on PACS.\par Results: Birads 4\par \par \par Last MRI:   4/23/2019                           Location: [facility] \par Report reviewed.                                     Images reviewed on PACS.\par Results: Birads 4aLeft breast 3:00 postbiopsy change and small enhancing mass with washout kinetic posterior and lateral to the marker. May repair present residual papilloma. Left breast 10:00 there is an enhancing mass with washout kinetic. 7:00 in the right breast there is a nodular rim enhancing mass with washout kinetic. MRI directed ultrasound is recommended with possible biopsy or MRI biopsy.\par

## 2019-07-17 NOTE — ASSESSMENT
[FreeTextEntry1] : doing well\par reassured that her nipples are laterally facing\par left breast 3:00 papilloma\par right breast benign pathology\par mg/sono OCT 2019\par left US 10N2 OCT 2019\par plan f/u MRI NOV 2019\par f/u NOV after imaging\par She knows to call or return sooner should any concerns or questions arise.\par

## 2019-07-17 NOTE — PHYSICAL EXAM
[Normocephalic] : normocephalic [Atraumatic] : atraumatic [Supple] : supple [No Supraclavicular Adenopathy] : no supraclavicular adenopathy [Examined in the supine and seated position] : examined in the supine and seated position [Symmetrical] : symmetrical [No dominant masses] : no dominant masses in right breast  [No dominant masses] : no dominant masses left breast [No Nipple Retraction] : no left nipple retraction [No Nipple Discharge] : no left nipple discharge [No Axillary Lymphadenopathy] : no left axillary lymphadenopathy [No Edema] : no edema [No Rashes] : no rashes [No Ulceration] : no ulceration [de-identified] : right breast UOQ hematoma not palpable, no nipple abnormalities improved since prior [de-identified] : healed periareolar incision , dermabond removed, no collections

## 2019-11-13 ENCOUNTER — APPOINTMENT (OUTPATIENT)
Dept: BREAST CENTER | Facility: CLINIC | Age: 44
End: 2019-11-13
Payer: COMMERCIAL

## 2019-11-13 VITALS
BODY MASS INDEX: 23.55 KG/M2 | SYSTOLIC BLOOD PRESSURE: 106 MMHG | HEART RATE: 72 BPM | HEIGHT: 62 IN | WEIGHT: 128 LBS | DIASTOLIC BLOOD PRESSURE: 71 MMHG

## 2019-11-13 DIAGNOSIS — D24.1 BENIGN NEOPLASM OF RIGHT BREAST: ICD-10-CM

## 2019-11-13 PROCEDURE — 99215 OFFICE O/P EST HI 40 MIN: CPT

## 2019-11-13 RX ORDER — RANITIDINE HYDROCHLORIDE 150 MG/1
150 TABLET, FILM COATED ORAL
Refills: 0 | Status: DISCONTINUED | COMMUNITY
End: 2019-11-13

## 2019-11-13 NOTE — PHYSICAL EXAM
[Normocephalic] : normocephalic [Supple] : supple [No Supraclavicular Adenopathy] : no supraclavicular adenopathy [Atraumatic] : atraumatic [Symmetrical] : symmetrical [Examined in the supine and seated position] : examined in the supine and seated position [No dominant masses] : no dominant masses left breast [No dominant masses] : no dominant masses in right breast  [No Nipple Retraction] : no left nipple retraction [No Nipple Discharge] : no left nipple discharge [No Axillary Lymphadenopathy] : no left axillary lymphadenopathy [No Edema] : no edema [No Rashes] : no rashes [No Ulceration] : no ulceration [de-identified] : healed periareolar incision, no masses, lumpy bumpy [de-identified] : healed periareolar incision , no masses, lumpy bumpy

## 2019-11-13 NOTE — ASSESSMENT
[FreeTextEntry1] : 44 yo female with high risk\par reassured her that her breast volume loss is likely related to fat loss\par reassured that her nipples are laterally facing\par left breast 3:00 papilloma\par right breast benign pathology\par mg/sono OCT 2020\par plan f/u MRI JAN 2021, then april 2022\par f/u 6 months for cbe\par She knows to call or return sooner should any concerns or questions arise.\par

## 2020-05-08 ENCOUNTER — APPOINTMENT (OUTPATIENT)
Dept: BREAST CENTER | Facility: CLINIC | Age: 45
End: 2020-05-08
Payer: COMMERCIAL

## 2020-05-08 DIAGNOSIS — Z86.018 PERSONAL HISTORY OF OTHER BENIGN NEOPLASM: ICD-10-CM

## 2020-05-08 DIAGNOSIS — Z87.898 PERSONAL HISTORY OF OTHER SPECIFIED CONDITIONS: ICD-10-CM

## 2020-05-08 DIAGNOSIS — Z80.41 FAMILY HISTORY OF MALIGNANT NEOPLASM OF OVARY: ICD-10-CM

## 2020-05-08 PROCEDURE — 99215 OFFICE O/P EST HI 40 MIN: CPT

## 2020-05-09 NOTE — ASSESSMENT
[FreeTextEntry1] : 43 yo female with high risk and h/o left breast 3:00 papilloma\par plan left targeted sono poss FNA poss mammo today\par reviewed imaging with Dr. White\par awaiting path from core bx, will call her asap\par if negative plan bilateral mg/sono OCT/NOV 2020\par plan f/u MRI JAN 2021, then april 2022\par f/u 6 months for cbe\par She knows to call or return sooner should any concerns or questions arise.\par

## 2020-05-09 NOTE — HISTORY OF PRESENT ILLNESS
[FreeTextEntry1] : This is a 44 year old female followed for high risk. She is s/p left stereotactic biopsy on 10/20/2016 done for increasing microcalcifications of the left breast lower outer quadrant. Pathology showed adenosis, sclerosing adenosis and involuting adenosis with scattered luminal calcifications. Path was found to be concordant and a f/u 6 month mammogram was recommended.  She is s/p left excisional biopsy in 11/2015. Path showed PASH and benign breast tissue. She has a history of bilateral excisional biopsies for fibroadenomas and a additional two core biopsies  of the left breast in 2002 and 2013.  Both areas were benign. She stated she first felt this lump on 9/2/15 and had negative imaging. She then had a breast MRI which showed an area at 10:00 of "suspicious enhancement" for which targeted ultrasound was recommended. This was done at [place] and a complicated cyst was found to correlate with the palpable finding and she was told to follow up in 6 months for ultrasound. On further workup, at left breast 10:00 2cm FN a circumscribed hypoechoic lesion correlates with the enhancing lesion seen on MRI at 10:00. IN addition at 7-8:00 in left breast 3cm FN adjacent to where the patient felt a lump a hypoechoic lesion with indistinct margins was seen and biopsy was recommended. The patient is s/p left 10:00 N2 USGBx 10/22/15, pathology shows nodular adenosis, with no atypia nor malignancy. At left -8:00 N3 pathology shows cystic change with associated papillary apocrine hyperplasia, no atypia nor malignancy. Patient is s/p left EBBx 11/12/15 for a palpable mass that was not seen on ultrasound. 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 \par \par Patient is s/p hip labrum tear repair on 09/29/2017. She is status post hysterectomy. On 4/9/19 she underwent left breast ultrasound-guided biopsy pathology showed benign intraductal papilloma with you D. age. Transected on one side. No atypia no malignancy.She then had screening breast MRI. On 4/25/19 she had left breast 10:00 ultrasound-guided biopsy which revealed benign results. And this was on an area as seen on her MRI with enhancement.On 5/6/19 she underwent right breast 7:00 MRI guided biopsy which had benign findings. And PAS H. and MRI biopsy right breast in upper inner quadrant which showed benign breast tissue and PASH.\par \par Status post left breast 3:00 excisional biopsy 6/6/19 pathology shows benign breast tissue with usual ductal hyperplasia, biopsy change present, no residual papilloma identified.She has no  breast complaints today.\par \par She thinks her breast shape has improved. In February she felt a left breast mass with associated burning feeling that goes to her nipple area. Mostly affected when she's sleeping on her side or with any touch.\par \par She does not SBE. \par She has noticed a change in her right breast or a breast lump on the right. Painful bruise on right breast.\par She has not noticed a change in her nipple or nipple area.\par She has noticed a change in the skin painful bruising of the right breast.\par She is not experiencing nipple discharge.\par She is experiencing right breast pain.\par She has not noticed a lump or lymph node under the armpit. \par \par BREAST CANCER RISK FACTORS\par Menarche: 9\par Date of LMP: 2/15/2019\par Menopause: post\par Grav: 2  Para:  2\par Age at first live birth: 32\par Nursed: no\par Hysterectomy: yes 3/5/2019\par Oophorectomy: no\par OCP: yes\par HRT: no\par Last pap/pelvic exam: 10/2019 WNL.\par Related family history: no\par Ashkenazi: no\par Mastery: BRCAPRO 10.7%, TCv6 10.8%, TCv7 26.3%, Sarah 28.8%\par BRCA testing: yes negative\par Bra size:  36B\par \par Last mammogram: 5/8/2020-left only      Location: [place]\par Report reviewed.                                 Images reviewed on PACS.\par Results: Birads 4\par  Comment located cyst versus solid mass sitting immediately \par  superficial to a cyst. Ultrasound-guided core biopsy of the mass and \par  aspiration of a simple cyst will be performed today.\par \par Last ultrasound:  5/8/2020-left only      Location: [place]\par Report reviewed.                                 Images reviewed on PACS.\par Results: Birads 4\par  Successful ultrasound guided core biopsy of a mass in the left \par  2:00 axis II cm from the nipple pathology pending. There was aspiration of a \par  simple cyst deep to this. The contents were not sent for histology. \par \par \par Last MRI:  11/6/2019                           Location[facility] \par Report reviewed.                                     Images reviewed on PACS.\par Results: Birads 2\par The biopsied enhancing lesion at 7:00 in the right breast is no\par longer present and the biopsied enhancing lesion at 10:00 in the left breast\par is decreased in size. Interval postoperative changes are present on the left.\par There is no suspicious MRI abnormality present. A follow-up breast MRI is\par recommended in one year. The patient is due for her annual bilateral\par mammography in October 2020.

## 2020-05-09 NOTE — PHYSICAL EXAM
[Normocephalic] : normocephalic [Atraumatic] : atraumatic [Supple] : supple [Examined in the supine and seated position] : examined in the supine and seated position [No Supraclavicular Adenopathy] : no supraclavicular adenopathy [Symmetrical] : symmetrical [No dominant masses] : no dominant masses in right breast  [No dominant masses] : no dominant masses left breast [No Nipple Retraction] : no left nipple retraction [No Nipple Discharge] : no left nipple discharge [No Axillary Lymphadenopathy] : no left axillary lymphadenopathy [No Rashes] : no rashes [No Edema] : no edema [No Ulceration] : no ulceration [de-identified] : healed periareolar incision, no masses, lumpy bumpy [de-identified] : healed periareolar incision , no masses, lumpy bumpy, in area of patient's concer 3:00 LOQ there is a 2cm mass, mobile, TTP

## 2020-06-04 ENCOUNTER — APPOINTMENT (OUTPATIENT)
Dept: BREAST CENTER | Facility: HOSPITAL | Age: 45
End: 2020-06-04
Payer: COMMERCIAL

## 2020-06-04 PROCEDURE — 14001 TIS TRNFR TRUNK 10.1-30SQCM: CPT

## 2020-06-04 PROCEDURE — 76098 X-RAY EXAM SURGICAL SPECIMEN: CPT | Mod: 26

## 2020-06-04 PROCEDURE — 19125 EXCISION BREAST LESION: CPT | Mod: LT,59

## 2020-06-12 ENCOUNTER — APPOINTMENT (OUTPATIENT)
Dept: BREAST CENTER | Facility: CLINIC | Age: 45
End: 2020-06-12
Payer: COMMERCIAL

## 2020-06-12 ENCOUNTER — APPOINTMENT (OUTPATIENT)
Dept: BREAST CENTER | Facility: CLINIC | Age: 45
End: 2020-06-12

## 2020-06-12 PROCEDURE — 99024 POSTOP FOLLOW-UP VISIT: CPT

## 2020-06-12 NOTE — ASSESSMENT
[FreeTextEntry1] : 45 yo with high risk\par f/u 1 month to ensure dermabond removal\par plan mg/sono OCT 2020\par plan MRI DEC 2020\par She knows to call or return sooner should any concerns or questions arise.\par

## 2020-06-12 NOTE — HISTORY OF PRESENT ILLNESS
[FreeTextEntry1] : This is a 44 year old female followed for high risk. She is s/p left stereotactic biopsy on 10/20/2016 done for increasing microcalcifications of the left breast lower outer quadrant. Pathology showed adenosis, sclerosing adenosis and involuting adenosis with scattered luminal calcifications. Path was found to be concordant and a f/u 6 month mammogram was recommended.  She is s/p left excisional biopsy in 11/2015. Path showed PASH and benign breast tissue. She has a history of bilateral excisional biopsies for fibroadenomas and a additional two core biopsies  of the left breast in 2002 and 2013.  Both areas were benign. She stated she first felt this lump on 9/2/15 and had negative imaging. She then had a breast MRI which showed an area at 10:00 of "suspicious enhancement" for which targeted ultrasound was recommended. This was done at [place] and a complicated cyst was found to correlate with the palpable finding and she was told to follow up in 6 months for ultrasound. On further workup, at left breast 10:00 2cm FN a circumscribed hypoechoic lesion correlates with the enhancing lesion seen on MRI at 10:00. IN addition at 7-8:00 in left breast 3cm FN adjacent to where the patient felt a lump a hypoechoic lesion with indistinct margins was seen and biopsy was recommended. The patient is s/p left 10:00 N2 USGBx 10/22/15, pathology shows nodular adenosis, with no atypia nor malignancy. At left -8:00 N3 pathology shows cystic change with associated papillary apocrine hyperplasia, no atypia nor malignancy. Patient is s/p left EBBx 11/12/15 for a palpable mass that was not seen on ultrasound. 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 \par \par Patient is s/p hip labrum tear repair on 09/29/2017. She is status post hysterectomy. On 4/9/19 she underwent left breast ultrasound-guided biopsy pathology showed benign intraductal papilloma with you D. age. Transected on one side. No atypia no malignancy.She then had screening breast MRI. On 4/25/19 she had left breast 10:00 ultrasound-guided biopsy which revealed benign results. And this was on an area as seen on her MRI with enhancement.On 5/6/19 she underwent right breast 7:00 MRI guided biopsy which had benign findings. And PAS H. and MRI biopsy right breast in upper inner quadrant which showed benign breast tissue and PASH.\par \par Status post left breast 3:00 excisional biopsy 6/6/19 pathology shows benign breast tissue with usual ductal hyperplasia, biopsy change present, no residual papilloma identified.She has no  breast complaints today.\par \par She is s/p left breast 2:00 6/4/2020 proliferative FCC, hyperplasia.\par \par She does not SBE. \par She has noticed a change in her right breast or a breast lump on the right. Painful bruise on right breast.\par She has not noticed a change in her nipple or nipple area.\par She has noticed a change in the skin painful bruising of the right breast.\par She is not experiencing nipple discharge.\par She is experiencing right breast pain.\par She has not noticed a lump or lymph node under the armpit. \par \par BREAST CANCER RISK FACTORS\par Menarche: 9\par Date of LMP: 2/15/2019\par Menopause: post\par Grav: 2  Para:  2\par Age at first live birth: 32\par Nursed: no\par Hysterectomy: yes 3/5/2019\par Oophorectomy: no\par OCP: yes\par HRT: no\par Last pap/pelvic exam: 10/2019 WNL.\par Related family history: mom with atypia in her 40's\par Ashkenazi: no\par Mastery: BRCAPRO 10.7%, TCv6 10.8%, TCv7 26.3%, Sarah 28.8%\par BRCA testing: yes negative\par Bra size:  36B\par \par Last mammogram: 5/8/2020-left only      Location: [place]\par Report reviewed.                                 Images reviewed on PACS.\par Results: Birads 4\par  Comment located cyst versus solid mass sitting immediately \par  superficial to a cyst. Ultrasound-guided core biopsy of the mass and \par  aspiration of a simple cyst will be performed today.\par \par Last ultrasound:  5/8/2020-left only      Location: [place]\par Report reviewed.                                 Images reviewed on PACS.\par Results: Birads 4\par  Successful ultrasound guided core biopsy of a mass in the left \par  2:00 axis II cm from the nipple pathology pending. There was aspiration of a \par  simple cyst deep to this. The contents were not sent for histology. \par \par \par Last MRI:  11/6/2019                           Location: [facility] \par Report reviewed.                                     Images reviewed on PACS.\par Results: Birads 2\par The biopsied enhancing lesion at 7:00 in the right breast is no\par longer present and the biopsied enhancing lesion at 10:00 in the left breast\par is decreased in size. Interval postoperative changes are present on the left.\par There is no suspicious MRI abnormality present. A follow-up breast MRI is\par recommended in one year. The patient is due for her annual bilateral\par mammography in October 2020.

## 2020-06-16 ENCOUNTER — APPOINTMENT (OUTPATIENT)
Dept: HEMATOLOGY ONCOLOGY | Facility: CLINIC | Age: 45
End: 2020-06-16
Payer: COMMERCIAL

## 2020-06-16 VITALS
DIASTOLIC BLOOD PRESSURE: 68 MMHG | HEART RATE: 74 BPM | RESPIRATION RATE: 18 BRPM | TEMPERATURE: 98.2 F | OXYGEN SATURATION: 97 % | HEIGHT: 62 IN | WEIGHT: 129.7 LBS | SYSTOLIC BLOOD PRESSURE: 107 MMHG | BODY MASS INDEX: 23.87 KG/M2

## 2020-06-16 PROCEDURE — 99205 OFFICE O/P NEW HI 60 MIN: CPT

## 2020-06-16 NOTE — PHYSICAL EXAM
[Restricted in physically strenuous activity but ambulatory and able to carry out work of a light or sedentary nature] : Status 1- Restricted in physically strenuous activity but ambulatory and able to carry out work of a light or sedentary nature, e.g., light house work, office work [Normal] : affect appropriate [de-identified] : left sided breast  greenish drainage , rash under left breast

## 2020-06-16 NOTE — ASSESSMENT
[FreeTextEntry1] : This is a 43 y/o premenopausal woman with a hx of  multiple prior breast biopsy and high dom score with strong family hx. \par \par 1) high risk breast cancer \par reviewed prior history and biopsies \par explained that high dom score is based on age of menarch and first childbrith as well as number of biopsies \par patient is high risk and would qualify for risk reducing chemo prevention \par patient is premanopausal so she is not eligible for ai or raloxifene \par discussed tamoxifen at length including  the benefit to reduce the risk of getting bresat cancer \par explained the mechanism and NSABP1 trial showing sig benefit compared to placebo \par reviewd the side effects including - hot flashes muscle pain vag dc , blood cloitting (patient to start asa ) \par (does not have uterus) \par also discussed recent data regarding low dose tamoxifen \par check hormonal levels\par check markers \par agree with mri alternating with mammo for high dom score\par follow up dr lee\par patient will call me with her decision on tamxoifen  \par \par 2) family hx of cancer \par patient up tdate on colonoscopy and gyn \par s/p tv ultrasound , will get ca 125 \par s/p genetics \par \par 3) vit d \par check levels \par \par 4) bone density \par unlikely to be and issue as patient is 43 y/o but discussed checking dexa \par \par 5) skin rash \par likely due to reaction to tape/soap? \par start hydrocortisone and benadrul \par dw dr lee \par \par dw patient at length over  1 hour

## 2020-06-16 NOTE — HISTORY OF PRESENT ILLNESS
[de-identified] : This is a 43 y/o premenopausal woman presenting for discussion on chemoprevention. Patient is s/p multiple prior biopsies, see note from dr lee in june 12, 2020. \par Pathology in 2015 was PASH on left. Pathology in may 2019 + PASH. \par Most recently she had a biopsy on left in june 2020 was  prolifereative FCC and hyperplasia.  She has never had any invasive or noninvasive cancer nor atypia. \par Patient is s/p hysterecotmy in may 2019 (but still has ovarian cancer) \par Patient has extesnive family hx of cancer \par She has  a high dom score , and is having mammogram alternating with mri. \par BRCA  negative \par  [de-identified] : Patient has some rash on left breast , and some drainage, no fever no erythema \par Denies any new complaints \par She had TV ultrasound  earlier today \par  [2 - Distress Level] : Distress Level: 2

## 2020-06-19 ENCOUNTER — APPOINTMENT (OUTPATIENT)
Dept: BREAST CENTER | Facility: CLINIC | Age: 45
End: 2020-06-19
Payer: COMMERCIAL

## 2020-06-19 ENCOUNTER — APPOINTMENT (OUTPATIENT)
Dept: HEMATOLOGY ONCOLOGY | Facility: CLINIC | Age: 45
End: 2020-06-19
Payer: COMMERCIAL

## 2020-06-19 VITALS
WEIGHT: 124 LBS | HEART RATE: 90 BPM | TEMPERATURE: 98.2 F | OXYGEN SATURATION: 98 % | SYSTOLIC BLOOD PRESSURE: 111 MMHG | HEIGHT: 62 IN | DIASTOLIC BLOOD PRESSURE: 75 MMHG | RESPIRATION RATE: 18 BRPM | BODY MASS INDEX: 22.82 KG/M2

## 2020-06-19 PROCEDURE — 99024 POSTOP FOLLOW-UP VISIT: CPT

## 2020-06-19 PROCEDURE — 99499A: CUSTOM | Mod: NC

## 2020-06-19 NOTE — ASSESSMENT
[FreeTextEntry1] : rec derm eval\par plan bilateral mg/sono OCT 2020\par plan f/u MRI JAN 2021, then april 2022\par f/u 2 weeks\par She knows to call or return sooner should any concerns or questions arise.\par

## 2020-06-19 NOTE — HISTORY OF PRESENT ILLNESS
[FreeTextEntry1] : This is a 44 year old female followed for high risk. She is s/p left stereotactic biopsy on 10/20/2016 done for increasing microcalcifications of the left breast lower outer quadrant. Pathology showed adenosis, sclerosing adenosis and involuting adenosis with scattered luminal calcifications. Path was found to be concordant and a f/u 6 month mammogram was recommended.  She is s/p left excisional biopsy in 11/2015. Path showed PASH and benign breast tissue. She has a history of bilateral excisional biopsies for fibroadenomas and a additional two core biopsies  of the left breast in 2002 and 2013.  Both areas were benign. She stated she first felt this lump on 9/2/15 and had negative imaging. She then had a breast MRI which showed an area at 10:00 of "suspicious enhancement" for which targeted ultrasound was recommended. This was done at [place] and a complicated cyst was found to correlate with the palpable finding and she was told to follow up in 6 months for ultrasound. On further workup, at left breast 10:00 2cm FN a circumscribed hypoechoic lesion correlates with the enhancing lesion seen on MRI at 10:00. IN addition at 7-8:00 in left breast 3cm FN adjacent to where the patient felt a lump a hypoechoic lesion with indistinct margins was seen and biopsy was recommended. The patient is s/p left 10:00 N2 USGBx 10/22/15, pathology shows nodular adenosis, with no atypia nor malignancy. At left -8:00 N3 pathology shows cystic change with associated papillary apocrine hyperplasia, no atypia nor malignancy. Patient is s/p left EBBx 11/12/15 for a palpable mass that was not seen on ultrasound. 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 \par \par Patient is s/p hip labrum tear repair on 09/29/2017. She is status post hysterectomy. On 4/9/19 she underwent left breast ultrasound-guided biopsy pathology showed benign intraductal papilloma with you D. age. Transected on one side. No atypia no malignancy.She then had screening breast MRI. On 4/25/19 she had left breast 10:00 ultrasound-guided biopsy which revealed benign results. And this was on an area as seen on her MRI with enhancement.On 5/6/19 she underwent right breast 7:00 MRI guided biopsy which had benign findings. And PAS H. and MRI biopsy right breast in upper inner quadrant which showed benign breast tissue and PASH.\par \par Status post left breast 3:00 excisional biopsy 6/6/19 pathology shows benign breast tissue with usual ductal hyperplasia, biopsy change present, no residual papilloma identified.She has no  breast complaints today.\par \par She is s/p left breast 2:00 6/4/2020 proliferative FCC, hyperplasia.\par \par She does not SBE. \par She has noticed a change in her right breast or a breast lump on the right. Painful bruise on right breast.\par She has not noticed a change in her nipple or nipple area.\par She has noticed a change in the skin painful bruising of the right breast.\par She is not experiencing nipple discharge.\par She is experiencing right breast pain.\par She has not noticed a lump or lymph node under the armpit. \par \par BREAST CANCER RISK FACTORS\par Menarche: 9\par Date of LMP: 2/15/2019\par Menopause: post\par Grav: 2  Para:  2\par Age at first live birth: 32\par Nursed: no\par Hysterectomy: yes 3/5/2019\par Oophorectomy: no\par OCP: yes\par HRT: no\par Last pap/pelvic exam: 10/2019 WNL.\par Related family history: mom with atypia in her 40's\par Ashkenazi: no\par Mastery: BRCAPRO 10.7%, TCv6 10.8%, TCv7 26.3%, Sarah 28.8%\par BRCA testing: yes negative\par Bra size:  36B\par \par Last mammogram: 5/8/2020-left only      Location: [place]\par Report reviewed.                                 Images reviewed on PACS.\par Results: Birads 4\par  Comment located cyst versus solid mass sitting immediately \par  superficial to a cyst. Ultrasound-guided core biopsy of the mass and \par  aspiration of a simple cyst will be performed today.\par \par Last ultrasound:  5/8/2020-left only      Location: [place]\par Report reviewed.                                 Images reviewed on PACS.\par Results: Birads 4\par  Successful ultrasound guided core biopsy of a mass in the left \par  2:00 axis II cm from the nipple pathology pending. There was aspiration of a \par  simple cyst deep to this. The contents were not sent for histology. \par \par \par Last MRI:  11/6/2019                           Location: [facility] \par Report reviewed.                                     Images reviewed on PACS.\par Results: Birads 2\par The biopsied enhancing lesion at 7:00 in the right breast is no\par longer present and the biopsied enhancing lesion at 10:00 in the left breast\par is decreased in size. Interval postoperative changes are present on the left.\par There is no suspicious MRI abnormality present. A follow-up breast MRI is\par recommended in one year. The patient is due for her annual bilateral\par mammography in October 2020.

## 2020-06-19 NOTE — PHYSICAL EXAM
[de-identified] : healed periareolar incision, c/d/i, no collections, dermabond removed there is a scattered LOQ vesicular rash and nipple edema, no drainage, no infection

## 2020-07-08 ENCOUNTER — APPOINTMENT (OUTPATIENT)
Dept: BREAST CENTER | Facility: CLINIC | Age: 45
End: 2020-07-08
Payer: COMMERCIAL

## 2020-07-08 PROCEDURE — 99024 POSTOP FOLLOW-UP VISIT: CPT

## 2020-07-08 NOTE — PHYSICAL EXAM
[de-identified] : healed periareolar incision, c/d/i, no collections, there is firmness near surgical bed but no associated skin changes

## 2020-07-08 NOTE — HISTORY OF PRESENT ILLNESS
[FreeTextEntry1] : This is a 44 year old female followed for high risk. She is s/p left stereotactic biopsy on 10/20/2016 done for increasing microcalcifications of the left breast lower outer quadrant. Pathology showed adenosis, sclerosing adenosis and involuting adenosis with scattered luminal calcifications. Path was found to be concordant and a f/u 6 month mammogram was recommended.  She is s/p left excisional biopsy in 11/2015. Path showed PASH and benign breast tissue. She has a history of bilateral excisional biopsies for fibroadenomas and a additional two core biopsies  of the left breast in 2002 and 2013.  Both areas were benign. She stated she first felt this lump on 9/2/15 and had negative imaging. She then had a breast MRI which showed an area at 10:00 of "suspicious enhancement" for which targeted ultrasound was recommended. This was done at [place] and a complicated cyst was found to correlate with the palpable finding and she was told to follow up in 6 months for ultrasound. On further workup, at left breast 10:00 2cm FN a circumscribed hypoechoic lesion correlates with the enhancing lesion seen on MRI at 10:00. IN addition at 7-8:00 in left breast 3cm FN adjacent to where the patient felt a lump a hypoechoic lesion with indistinct margins was seen and biopsy was recommended. The patient is s/p left 10:00 N2 USGBx 10/22/15, pathology shows nodular adenosis, with no atypia nor malignancy. At left -8:00 N3 pathology shows cystic change with associated papillary apocrine hyperplasia, no atypia nor malignancy. Patient is s/p left EBBx 11/12/15 for a palpable mass that was not seen on ultrasound. 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 \par \par Patient is s/p hip labrum tear repair on 09/29/2017. She is status post hysterectomy. On 4/9/19 she underwent left breast ultrasound-guided biopsy pathology showed benign intraductal papilloma with you D. age. Transected on one side. No atypia no malignancy.She then had screening breast MRI. On 4/25/19 she had left breast 10:00 ultrasound-guided biopsy which revealed benign results. And this was on an area as seen on her MRI with enhancement.On 5/6/19 she underwent right breast 7:00 MRI guided biopsy which had benign findings. And PAS H. and MRI biopsy right breast in upper inner quadrant which showed benign breast tissue and PASH.\par \par Status post left breast 3:00 excisional biopsy 6/6/19 pathology shows benign breast tissue with usual ductal hyperplasia, biopsy change present, no residual papilloma identified.She has no  breast complaints today.\par \par She is s/p left breast 2:00 6/4/2020 proliferative FCC, hyperplasia. She had contact dermatitis post op and saw Dr. Davis and was given steroids. States it is all resolved.\par \par She does not SBE. \par She has noticed a change in her right breast or a breast lump on the right. Painful bruise on right breast.\par She has not noticed a change in her nipple or nipple area.\par She has noticed a change in the skin painful bruising of the right breast.\par She is not experiencing nipple discharge.\par She is experiencing right breast pain.\par She has not noticed a lump or lymph node under the armpit. \par \par BREAST CANCER RISK FACTORS\par Menarche: 9\par Date of LMP: 2/15/2019\par Menopause: post\par Grav: 2  Para:  2 (Carlin and Valeriy)\par Age at first live birth: 32\par Nursed: no\par Hysterectomy: yes 3/5/2019\par Oophorectomy: no\par OCP: yes\par HRT: no\par Last pap/pelvic exam: 10/2019 WNL.\par Related family history: mom with atypia in her 40's\par Ashkenazi: no\par Mastery: BRCAPRO 10.7%, TCv6 10.8%, TCv7 26.3%, Sarah 28.8%\par BRCA testing: yes negative\par Bra size:  36B\par \par Last mammogram: 5/8/2020-left only      Location: [place]\par Report reviewed.                                 Images reviewed on PACS.\par Results: Birads 4\par  Comment located cyst versus solid mass sitting immediately \par  superficial to a cyst. Ultrasound-guided core biopsy of the mass and \par  aspiration of a simple cyst will be performed today.\par \par Last ultrasound:  5/8/2020-left only      Location: [place]\par Report reviewed.                                 Images reviewed on PACS.\par Results: Birads 4\par  Successful ultrasound guided core biopsy of a mass in the left \par  2:00 axis II cm from the nipple pathology pending. There was aspiration of a \par  simple cyst deep to this. The contents were not sent for histology. \par \par \par Last MRI:  11/6/2019                           Location: [facility] \par Report reviewed.                                     Images reviewed on PACS.\par Results: Birads 2\par The biopsied enhancing lesion at 7:00 in the right breast is no\par longer present and the biopsied enhancing lesion at 10:00 in the left breast\par is decreased in size. Interval postoperative changes are present on the left.\par There is no suspicious MRI abnormality present. A follow-up breast MRI is\par recommended in one year. The patient is due for her annual bilateral\par mammography in October 2020.

## 2020-07-08 NOTE — ASSESSMENT
[FreeTextEntry1] : 43 yo female with high risk\par plan bilateral mg/sono OCT 2020\par plan f/u MRI JAN 2021, then april 2022\par f/u OCT imaging\par She knows to call or return sooner should any concerns or questions arise.\par

## 2020-07-22 ENCOUNTER — APPOINTMENT (OUTPATIENT)
Dept: HEMATOLOGY ONCOLOGY | Facility: CLINIC | Age: 45
End: 2020-07-22
Payer: COMMERCIAL

## 2020-07-22 VITALS
TEMPERATURE: 98.3 F | WEIGHT: 125.5 LBS | HEIGHT: 62 IN | DIASTOLIC BLOOD PRESSURE: 68 MMHG | SYSTOLIC BLOOD PRESSURE: 119 MMHG | RESPIRATION RATE: 20 BRPM | HEART RATE: 71 BPM | BODY MASS INDEX: 23.1 KG/M2 | OXYGEN SATURATION: 100 %

## 2020-07-22 PROCEDURE — 99214 OFFICE O/P EST MOD 30 MIN: CPT

## 2020-07-22 NOTE — ASSESSMENT
[FreeTextEntry1] : This is a 43 y/o premenopausal woman with a hx of  multiple prior breast biopsy and high dom score with strong family hx. \par \par 1) high risk breast cancer \par started tamoxiifen in june 2020 \par tolerating well \par premenopausal by labs \par cont tamoxifen for 5 years \par educated on risk of dvt pe and stroke \par decrease dose of asa to every other day for bruising \par   mri alternating with mammo for high dom score\par \par \par 2) family hx of cancer \par patient up tdate on colonoscopy and gyn \par s/p tv ultrasound \par s/p genetics \par \par 3) vit d \par cont vit d \par \par 4) bone density \par unlikely to be and issue as patient is 43 y/o but discussed checking dexa \par \par follow up in 6 months \par see dr lee in oct 2020 \par dw patient at length

## 2020-07-22 NOTE — REASON FOR VISIT
[Follow-Up Visit] : a follow-up [FreeTextEntry2] : Patient at high risk of breast cancer , recently started tamoxifen

## 2020-07-22 NOTE — HISTORY OF PRESENT ILLNESS
[0 - No Distress] : Distress Level: 0 [de-identified] : This is a 45 y/o premenopausal woman presenting for discussion on chemoprevention. Patient is s/p multiple prior biopsies, see note from dr lee in june 12, 2020. \par Pathology in 2015 was PASH on left. Pathology in may 2019 + PASH. \par Most recently she had a biopsy on left in june 2020 was  prolifereative FCC and hyperplasia.  She has never had any invasive or noninvasive cancer nor atypia. \par Patient is s/p hysterecotmy in may 2019 (but still has ovarian cancer) \par Patient has extesnive family hx of cancer \par She has  a high dom score , and is having mammogram alternating with mri. \par BRCA  negative \par  [de-identified] : Ultrasound was ok with gyn, ok with tamoxifen \par \par seeing dr lee in oct 2020 \par started tamoxifen about 1 month \par slight headache for 3 days \par had nausea for 3 days now  all symptoms better \par increased brusiing \par \par did have whole gi evaluation \par ferriitin has been low in past , concerned it may be low \par \par

## 2020-07-30 ENCOUNTER — APPOINTMENT (OUTPATIENT)
Dept: PLASTIC SURGERY | Facility: CLINIC | Age: 45
End: 2020-07-30
Payer: COMMERCIAL

## 2020-07-30 VITALS
WEIGHT: 126 LBS | HEIGHT: 62 IN | HEART RATE: 78 BPM | OXYGEN SATURATION: 99 % | BODY MASS INDEX: 23.19 KG/M2 | RESPIRATION RATE: 20 BRPM | SYSTOLIC BLOOD PRESSURE: 94 MMHG | DIASTOLIC BLOOD PRESSURE: 63 MMHG | TEMPERATURE: 99.1 F

## 2020-07-30 PROCEDURE — 99204 OFFICE O/P NEW MOD 45 MIN: CPT

## 2020-07-31 ENCOUNTER — APPOINTMENT (OUTPATIENT)
Dept: BREAST CENTER | Facility: CLINIC | Age: 45
End: 2020-07-31

## 2020-07-31 NOTE — ASSESSMENT
[FreeTextEntry1] : pt is not a suitable candidate for fat grafting given many prior biopsies.  she is a candidate for breast augmentation with either gel or saline implants  disc differences The risks, benefits, alternatives, limitations and the permanent scars were outlined with the patient. \par

## 2020-07-31 NOTE — HISTORY OF PRESENT ILLNESS
[FreeTextEntry1] : pt is 45 y/o wf c/o high risk breasts with multiple biopsies and she does not wan to have mastectomies. she complains of loss of volume from multiple biopsies and lack of nice contours.  She desires augmentation and is suggesting fat grafting.  The risks, benefits, alternatives, limitations and the permanent scars were outlined with the patient. I have told her that fat grafting is possible but it may further complicate the difficult task of surveillance with areas of fat necrosis and calcifications.  she has mgm and m with br cancer  no genetic mutations found on testing . pt is a candidate for augmentation The risks, benefits, alternatives, limitations and the permanent scars were outlined with the patient. disc gel vs saline  imf scar under muscle and foreign body  pt gets mri s regularly and sono and mammo and pe will help.  also if she needs mastectomies later muscle is already expanded and procedure becomes sq mastectomy potentially \par

## 2020-07-31 NOTE — PHYSICAL EXAM
[NI] : Normal [de-identified] : sn n 21 cm petit chest no palp mass nlk sensation  grade 1 ptosis  contour irregularities from volume loss laterally  nipple sl eccentric in appearance but 9.5 cm from midline

## 2020-09-29 ENCOUNTER — APPOINTMENT (OUTPATIENT)
Dept: PLASTIC SURGERY | Facility: CLINIC | Age: 45
End: 2020-09-29
Payer: SELF-PAY

## 2020-09-29 DIAGNOSIS — N65.0 DEFORMITY OF RECONSTRUCTED BREAST: ICD-10-CM

## 2020-09-29 PROCEDURE — 99213 OFFICE O/P EST LOW 20 MIN: CPT

## 2020-09-29 NOTE — HISTORY OF PRESENT ILLNESS
[FreeTextEntry1] : pt has high risk for breast cancer with strong fh and ph biopsies which have deformed her breast l more than r  .  pt may ultimately have a bilateral mastectomy  for now she desires implants and we discussed silicone vs saline and subpectoral for possibility of later mastectomy direct to implant facilitation.  The risks, benefits, alternatives, limitations and the permanent scars were outlined with the patient.\par

## 2020-09-29 NOTE — ASSESSMENT
[FreeTextEntry1] : pt with high risk breasts for augmentation  she is having mammo sono oct and wishes to proceed with augmentation gel implants under muscle via imf scar .  pt may need mastectomies later and will have pre expanded subpectoral pocket to avoid allograft requirement

## 2020-09-29 NOTE — REVIEW OF SYSTEMS
[de-identified] : rash from skin adhesives and glues  [FreeTextEntry4] : multiple benign breast biopsies

## 2020-10-16 DIAGNOSIS — Z87.2 PERSONAL HISTORY OF DISEASES OF THE SKIN AND SUBCUTANEOUS TISSUE: ICD-10-CM

## 2020-10-19 ENCOUNTER — APPOINTMENT (OUTPATIENT)
Dept: BREAST CENTER | Facility: CLINIC | Age: 45
End: 2020-10-19
Payer: COMMERCIAL

## 2020-10-19 VITALS
HEART RATE: 69 BPM | WEIGHT: 122 LBS | BODY MASS INDEX: 22.45 KG/M2 | DIASTOLIC BLOOD PRESSURE: 65 MMHG | HEIGHT: 62 IN | SYSTOLIC BLOOD PRESSURE: 101 MMHG

## 2020-10-19 PROCEDURE — 99215 OFFICE O/P EST HI 40 MIN: CPT

## 2020-10-19 RX ORDER — METHYLPREDNISOLONE 4 MG/1
4 TABLET ORAL
Qty: 21 | Refills: 0 | Status: DISCONTINUED | COMMUNITY
Start: 2020-06-19

## 2020-10-19 RX ORDER — TRIAMCINOLONE ACETONIDE 1 MG/G
0.1 OINTMENT TOPICAL
Qty: 30 | Refills: 0 | Status: DISCONTINUED | COMMUNITY
Start: 2020-06-19

## 2020-10-19 NOTE — ASSESSMENT
[FreeTextEntry1] : 43 yo female with high risk\par plan bilateral mg/sono OCT 2021\par plan f/u MRI JAN 2021, then april 2022--will d/w DAP about timing\par f/u 6 months\par She knows to call or return sooner should any concerns or questions arise.\par

## 2020-10-19 NOTE — HISTORY OF PRESENT ILLNESS
[FreeTextEntry1] : This is a 44 year old female followed for high risk. She is s/p left stereotactic biopsy on 10/20/2016 done for increasing microcalcifications of the left breast lower outer quadrant. Pathology showed adenosis, sclerosing adenosis and involuting adenosis with scattered luminal calcifications. Path was found to be concordant and a f/u 6 month mammogram was recommended.  She is s/p left excisional biopsy in 11/2015. Path showed PASH and benign breast tissue. She has a history of bilateral excisional biopsies for fibroadenomas and a additional two core biopsies  of the left breast in 2002 and 2013.  Both areas were benign. She stated she first felt this lump on 9/2/15 and had negative imaging. She then had a breast MRI which showed an area at 10:00 of "suspicious enhancement" for which targeted ultrasound was recommended. This was done at [place] and a complicated cyst was found to correlate with the palpable finding and she was told to follow up in 6 months for ultrasound. On further workup, at left breast 10:00 2cm FN a circumscribed hypoechoic lesion correlates with the enhancing lesion seen on MRI at 10:00. IN addition at 7-8:00 in left breast 3cm FN adjacent to where the patient felt a lump a hypoechoic lesion with indistinct margins was seen and biopsy was recommended. The patient is s/p left 10:00 N2 USGBx 10/22/15, pathology shows nodular adenosis, with no atypia nor malignancy. At left -8:00 N3 pathology shows cystic change with associated papillary apocrine hyperplasia, no atypia nor malignancy. Patient is s/p left EBBx 11/12/15 for a palpable mass that was not seen on ultrasound. 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 \par \par Patient is s/p hip labrum tear repair on 09/29/2017. She is status post hysterectomy. On 4/9/19 she underwent left breast ultrasound-guided biopsy pathology showed benign intraductal papilloma with you D. age. Transected on one side. No atypia no malignancy.She then had screening breast MRI. On 4/25/19 she had left breast 10:00 ultrasound-guided biopsy which revealed benign results. And this was on an area as seen on her MRI with enhancement.On 5/6/19 she underwent right breast 7:00 MRI guided biopsy which had benign findings. And PAS H. and MRI biopsy right breast in upper inner quadrant which showed benign breast tissue and PASH.\par \par Status post left breast 3:00 excisional biopsy 6/6/19 pathology shows benign breast tissue with usual ductal hyperplasia, biopsy change present, no residual papilloma identified.She has no  breast complaints today.\par \par She is s/p left breast 2:00 6/4/2020 proliferative FCC, hyperplasia. She had contact dermatitis post op and saw Dr. Davis and was given steroids. States it is all resolved. She is having breast augmentation with DAP Wednesday.\par \par She does not SBE. \par She has noticed a change in her right breast or a breast lump on the right. Painful bruise on right breast.\par She has not noticed a change in her nipple or nipple area.\par She has noticed a change in the skin painful bruising of the right breast.\par She is not experiencing nipple discharge.\par She is experiencing right breast pain.\par She has not noticed a lump or lymph node under the armpit. \par \par BREAST CANCER RISK FACTORS\par Menarche: 9\par Date of LMP: 2/15/2019\par Menopause: post\par Grav: 2  Para:  2 (Carlin and Valeriy)\par Age at first live birth: 32\par Nursed: no\par Hysterectomy: yes 3/5/2019\par Oophorectomy: no\par OCP: yes\par HRT: no\par Last pap/pelvic exam: 10/2019 WNL.\par Related family history: mom with atypia in her 40's\par Ashkenazi: no\par Mastery: BRCAPRO 10.7%, TCv6 10.8%, TCv7 26.3%, Sarah 28.8%\par BRCA testing: yes negative\par Bra size:  36B\par \par Last mammogram: 10/8/2020 right     Location: [place]\par Report reviewed.                                 Images reviewed on PACS.\par Results: Birads 2\par No suspicious findings. \par \par Last ultrasound:  10/7/2020      Location: [place]\par Report reviewed.                                 Images reviewed on PACS.\par Results: Birads 0\par 2-3:00 in the left periareolar region hypoechoic scar. 10:00 in the left breast 2cm from the nipple 6 x 7 x 3mm\par \par Last MRI:  11/6/2019                           Location: [facility] \par Report reviewed.                                     Images reviewed on PACS.\par Results: Birads 2\par The biopsied enhancing lesion at 7:00 in the right breast is no\par longer present and the biopsied enhancing lesion at 10:00 in the left breast\par is decreased in size. Interval postoperative changes are present on the left.\par There is no suspicious MRI abnormality present. A follow-up breast MRI is\par recommended in one year. The patient is due for her annual bilateral\par mammography in October 2020.

## 2020-10-21 ENCOUNTER — APPOINTMENT (OUTPATIENT)
Dept: PLASTIC SURGERY | Facility: HOSPITAL | Age: 45
End: 2020-10-21
Payer: COMMERCIAL

## 2020-10-21 PROCEDURE — 19325 BREAST AUGMENTATION W/IMPLT: CPT

## 2020-10-27 ENCOUNTER — APPOINTMENT (OUTPATIENT)
Dept: PLASTIC SURGERY | Facility: CLINIC | Age: 45
End: 2020-10-27
Payer: COMMERCIAL

## 2020-10-27 VITALS
DIASTOLIC BLOOD PRESSURE: 73 MMHG | TEMPERATURE: 117 F | OXYGEN SATURATION: 98 % | HEART RATE: 117 BPM | SYSTOLIC BLOOD PRESSURE: 106 MMHG | RESPIRATION RATE: 20 BRPM

## 2020-10-27 PROCEDURE — 99024 POSTOP FOLLOW-UP VISIT: CPT

## 2020-10-27 NOTE — ASSESSMENT
[FreeTextEntry1] : excellent appearance  disc different size hp implants . The instructions were reviewed in detail with JOSE.\makayla GARCIA will return to the office for a post procedure visit 6 weeks

## 2020-11-05 ENCOUNTER — APPOINTMENT (OUTPATIENT)
Dept: PLASTIC SURGERY | Facility: CLINIC | Age: 45
End: 2020-11-05
Payer: SELF-PAY

## 2020-11-05 PROCEDURE — 99024 POSTOP FOLLOW-UP VISIT: CPT

## 2020-11-11 NOTE — ASSESSMENT
[FreeTextEntry1] : pt has normal vital signs  no evidence of infection delayed healing or compromise of the surgery.  pt reassured  JOSE will return to the office for a post procedure visit in several weeks or sooner prn

## 2020-11-11 NOTE — HISTORY OF PRESENT ILLNESS
[FreeTextEntry1] : pt has vague pain r breast  r hand dominant  no findings to suggest complicated recovery JOSE  has had uncomplicated recovery from surgery and anesthesia\par The patient denies fever,chills, shortness of breath, chest pain, calf pain\par

## 2020-12-01 ENCOUNTER — APPOINTMENT (OUTPATIENT)
Dept: PLASTIC SURGERY | Facility: CLINIC | Age: 45
End: 2020-12-01
Payer: SELF-PAY

## 2020-12-01 PROCEDURE — 99024 POSTOP FOLLOW-UP VISIT: CPT

## 2020-12-01 NOTE — ASSESSMENT
[FreeTextEntry1] : pt is doing very well The instructions were reviewed in detail with JOSE.\par JOSE will return to the office for a post procedure visit annually JOSE  has had uncomplicated recovery from surgery and anesthesia\par

## 2020-12-01 NOTE — HISTORY OF PRESENT ILLNESS
[FreeTextEntry1] : pt is doing very well states that l side is fine r side feels harder and higher.  no evidence of contracture or position issues  nl sensation excellent shape and symmetry  grade 1 capsules  excellent appearance

## 2021-01-20 ENCOUNTER — APPOINTMENT (OUTPATIENT)
Dept: HEMATOLOGY ONCOLOGY | Facility: CLINIC | Age: 46
End: 2021-01-20
Payer: COMMERCIAL

## 2021-01-20 VITALS
WEIGHT: 123 LBS | BODY MASS INDEX: 22.63 KG/M2 | HEIGHT: 62 IN | TEMPERATURE: 98 F | OXYGEN SATURATION: 100 % | SYSTOLIC BLOOD PRESSURE: 103 MMHG | HEART RATE: 76 BPM | RESPIRATION RATE: 18 BRPM | DIASTOLIC BLOOD PRESSURE: 68 MMHG

## 2021-01-20 PROCEDURE — 99072 ADDL SUPL MATRL&STAF TM PHE: CPT

## 2021-01-20 PROCEDURE — 99214 OFFICE O/P EST MOD 30 MIN: CPT

## 2021-01-20 NOTE — HISTORY OF PRESENT ILLNESS
[de-identified] : This is a 43 y/o premenopausal woman presenting for discussion on chemoprevention. Patient is s/p multiple prior biopsies, see note from dr lee in june 12, 2020. \par Pathology in 2015 was PASH on left. Pathology in may 2019 + PASH. \par Most recently she had a biopsy on left in june 2020 was  prolifereative FCC and hyperplasia.  She has never had any invasive or noninvasive cancer nor atypia. \par Patient is s/p hysterecotmy in may 2019 (but still has ovarian cancer) \par Patient has extesnive family hx of cancer \par She has  a high dom score , and is having mammogram alternating with mri. \par BRCA  negative \par  [de-identified] : \par saw plastics saw dr jonas , put in small implant bilateral  in oct 21, 2020 \par will see dr lee in april 2021 \par mri of breast in april 2021 \par on tamoxifen for 6 months \par hot flashes then feel cold \par not taking any oral iron bc "doesn’t work " \par nails brittle \par working out , has a lot of energy \par headache better , nausea better \par no bleeding \par no sob \par  no

## 2021-01-20 NOTE — ASSESSMENT
[FreeTextEntry1] : This is a 43 y/o premenopausal woman with a hx of  multiple prior breast biopsy and high dom score with strong family hx. \par \par 1) high risk breast cancer \par started tamoxiifen in june 2020 \par tolerating well \par Patient will continue receiving MRIs of breast alternating with mammogram for high risk screening\par No signs or symptoms of active disease by today's exam she will continue to follow with the breast surgeon Dr. Quintero\par Continue tamoxifen again educated on the risk of thrombosis, patient is status post hysterectomy\par \par \par 2) family hx of cancer \par patient up tdate on colonoscopy and gyn \par s/p tv ultrasound \par s/p genetics \par \par 3) vit d \par cont vit d \par \par 4) bone density \par Advise baseline bone density\par \par 5) iron def \par Repeat iron levels\par \par 6) b12 def \par Repeat  B12 level\par \par follow up in 6 months \par dw patient at length

## 2021-01-20 NOTE — PHYSICAL EXAM
[de-identified] : Bilateral breast implants in place, no masses or lesions noted, no skin lesions no adenopathy

## 2021-01-25 LAB
25(OH)D3 SERPL-MCNC: 34 NG/ML
FERRITIN SERPL-MCNC: 142 NG/ML
FOLATE SERPL-MCNC: 15.5 NG/ML
IRON SATN MFR SERPL: 40 %
IRON SERPL-MCNC: 95 UG/DL
TIBC SERPL-MCNC: 237 UG/DL
UIBC SERPL-MCNC: 141 UG/DL
VIT B12 SERPL-MCNC: 609 PG/ML

## 2021-01-26 ENCOUNTER — NON-APPOINTMENT (OUTPATIENT)
Age: 46
End: 2021-01-26

## 2021-04-13 ENCOUNTER — NON-APPOINTMENT (OUTPATIENT)
Age: 46
End: 2021-04-13

## 2021-04-14 ENCOUNTER — APPOINTMENT (OUTPATIENT)
Dept: BREAST CENTER | Facility: CLINIC | Age: 46
End: 2021-04-14
Payer: COMMERCIAL

## 2021-04-14 VITALS
BODY MASS INDEX: 22.63 KG/M2 | DIASTOLIC BLOOD PRESSURE: 65 MMHG | SYSTOLIC BLOOD PRESSURE: 102 MMHG | HEART RATE: 81 BPM | WEIGHT: 123 LBS | HEIGHT: 62 IN

## 2021-04-14 DIAGNOSIS — N60.91 UNSPECIFIED BENIGN MAMMARY DYSPLASIA OF RIGHT BREAST: ICD-10-CM

## 2021-04-14 DIAGNOSIS — Z80.0 FAMILY HISTORY OF MALIGNANT NEOPLASM OF DIGESTIVE ORGANS: ICD-10-CM

## 2021-04-14 PROCEDURE — 99215 OFFICE O/P EST HI 40 MIN: CPT

## 2021-04-14 PROCEDURE — 99072 ADDL SUPL MATRL&STAF TM PHE: CPT

## 2021-04-14 NOTE — PHYSICAL EXAM
[Normocephalic] : normocephalic [Atraumatic] : atraumatic [Supple] : supple [No Supraclavicular Adenopathy] : no supraclavicular adenopathy [Examined in the supine and seated position] : examined in the supine and seated position [Symmetrical] : symmetrical [No dominant masses] : no dominant masses in right breast  [No dominant masses] : no dominant masses left breast [No Nipple Retraction] : no left nipple retraction [No Nipple Discharge] : no left nipple discharge [No Axillary Lymphadenopathy] : no left axillary lymphadenopathy [No Edema] : no edema [No Rashes] : no rashes [No Ulceration] : no ulceration [de-identified] : implants soft, in place, no masses, excellent cosmesis [de-identified] : healed periareolar incision

## 2021-04-14 NOTE — ASSESSMENT
[FreeTextEntry1] : 44 yo female with high risk\par plan bilateral mg/sono OCT 2021\par plan f/u MRI  april 2022--will d/w DAP about timing\par cont tamoxifen with Dr. CASTANEDA\par f/u 6 months\par She knows to call or return sooner should any concerns or questions arise.\par

## 2021-04-30 ENCOUNTER — APPOINTMENT (OUTPATIENT)
Dept: PLASTIC SURGERY | Facility: CLINIC | Age: 46
End: 2021-04-30
Payer: SELF-PAY

## 2021-04-30 VITALS
DIASTOLIC BLOOD PRESSURE: 62 MMHG | SYSTOLIC BLOOD PRESSURE: 99 MMHG | OXYGEN SATURATION: 100 % | TEMPERATURE: 98.2 F | RESPIRATION RATE: 20 BRPM | HEART RATE: 80 BPM

## 2021-04-30 DIAGNOSIS — N64.82 HYPOPLASIA OF BREAST: ICD-10-CM

## 2021-04-30 PROCEDURE — 99024 POSTOP FOLLOW-UP VISIT: CPT

## 2021-04-30 NOTE — ASSESSMENT
[FreeTextEntry1] : JOSE  has had uncomplicated recovery from surgery and anesthesia The patient denies fever,chills, shortness of breath, chest pain, calf pain .good appearance and shape and symmetry  scars well healed and normal. JOSE will return to the office for a post procedure visit annually

## 2021-04-30 NOTE — HISTORY OF PRESENT ILLNESS
[FreeTextEntry1] : pt returns early because she is concerned about her scars  she is following up with breast onc surgeon regularly  pt has mild scar widening without hypertrophy  The risks, benefits, alternatives, limitations and the permanent scars were outlined with the patient.  she can use topical silicone and if desired can start laser treatments .

## 2021-07-16 ENCOUNTER — APPOINTMENT (OUTPATIENT)
Dept: HEMATOLOGY ONCOLOGY | Facility: CLINIC | Age: 46
End: 2021-07-16
Payer: COMMERCIAL

## 2021-07-16 VITALS
HEIGHT: 62 IN | OXYGEN SATURATION: 98 % | RESPIRATION RATE: 18 BRPM | WEIGHT: 125 LBS | SYSTOLIC BLOOD PRESSURE: 101 MMHG | HEART RATE: 64 BPM | BODY MASS INDEX: 23 KG/M2 | TEMPERATURE: 99.6 F | DIASTOLIC BLOOD PRESSURE: 63 MMHG

## 2021-07-16 DIAGNOSIS — N63.20 UNSPECIFIED LUMP IN THE LEFT BREAST, UNSPECIFIED QUADRANT: ICD-10-CM

## 2021-07-16 PROCEDURE — 99072 ADDL SUPL MATRL&STAF TM PHE: CPT

## 2021-07-16 PROCEDURE — 99214 OFFICE O/P EST MOD 30 MIN: CPT

## 2021-07-16 NOTE — HISTORY OF PRESENT ILLNESS
[de-identified] : This is a 45 y/o premenopausal woman presenting for discussion on chemoprevention. Patient is s/p multiple prior biopsies, see note from dr lee in june 12, 2020. \par Pathology in 2015 was PASH on left. Pathology in may 2019 + PASH. \par Most recently she had a biopsy on left in june 2020 was  prolifereative FCC and hyperplasia.  She has never had any invasive or noninvasive cancer nor atypia. \par Patient is s/p hysterecotmy in may 2019 (but still has ovarian cancer) \par Patient has extesnive family hx of cancer \par She has  a high dom score , and is having mammogram alternating with mri. \par BRCA  negative \par \par \par started tamoxifen in june 2020 \par \par saw plastics saw dr jonas , put in small implant bilateral  in oct 21, 2020 \par will see dr lee in april 2021 \par mri of breast in april 2021 \par j\par  [de-identified] : \par tamoxifen  still doing , not on asa anymore \par taking flinestones \par vit d , fiber pills \par saw gyn \par 1 month really bad \par mammo oct \par saw palai  and chatterji in april 2021 \par going back in october \par mri in april 2021 \par no headache no nausea \par \par \par \par

## 2021-07-16 NOTE — ASSESSMENT
[FreeTextEntry1] : This is a 45 y/o premenopausal woman with a hx of  multiple prior breast biopsy and high dom score with strong family hx. \par \par 1) high risk breast cancer \par started tamoxiifen in june 2020 \par Overall patient is tolerating well, continue tamoxifen for chemoprevention of breast cancer\par Continue to follow with Dr. Quintero and Dr. orosco \par MRI of the breast on April 2021, due for mammogram in October 2020\par Patient again was educated on the side effects of tamoxifen including the risk of thrombosis both arterial and venous including DVT PE and stroke.  Patient is not taking baby aspirin due to easy bruising\par Patient has had a hysterectomy.\par Continue tamoxifen \par \par \par 2) family hx of cancer \par patient up tdate on colonoscopy and gyn \par up todate  tv ultrasound \par s/p genetics \par \par 3) vit d \par cont vit d \par \par 4) bone density \par dexa ordered \par \par 5) iron def \par Repeat iron levels\par \par 6) b12 def \par Repeat  B12 level\par \par follow up in 6 months \par dw patient at length

## 2021-07-16 NOTE — REVIEW OF SYSTEMS
[Negative] : Neurological [Fatigue] : no fatigue [Recent Change In Weight] : ~T no recent weight change [FreeTextEntry2] : hot flashes , no exercising as miuch  [FreeTextEntry8] : bleeding after sex , has bladder sleeve , will be going for tv  utraound ovaries ( cyst )  [FreeTextEntry9] : hurt left shoulder

## 2021-07-16 NOTE — REASON FOR VISIT
[Follow-Up Visit] : a follow-up [FreeTextEntry2] : Patient presents for follow-up of high risk of breast cancer and iron deficiency

## 2021-07-18 LAB
APTT BLD: 29.2 SEC
ESTRADIOL SERPL-MCNC: 257 PG/ML
FERRITIN SERPL-MCNC: 166 NG/ML
FIBRINOGEN PPP COAG.DERIVED-MCNC: 451 MG/DL
FOLATE SERPL-MCNC: >20 NG/ML
FSH SERPL-MCNC: 6.6 IU/L
IRON SATN MFR SERPL: 30 %
IRON SERPL-MCNC: 81 UG/DL
LH SERPL-ACNC: 10 IU/L
TIBC SERPL-MCNC: 270 UG/DL
TSH SERPL-ACNC: 0.56 UIU/ML
UIBC SERPL-MCNC: 188 UG/DL
VIT B12 SERPL-MCNC: 585 PG/ML

## 2021-07-21 LAB
25(OH)D3 SERPL-MCNC: 47.8 NG/ML
CANCER AG15-3 SERPL-ACNC: 12.1 U/ML
CEA SERPL-MCNC: 0.7 NG/ML
FERRITIN SERPL-MCNC: 166 NG/ML
FOLATE SERPL-MCNC: >20 NG/ML
IRON SATN MFR SERPL: 31 %
IRON SERPL-MCNC: 81 UG/DL
TIBC SERPL-MCNC: 261 UG/DL
UIBC SERPL-MCNC: 180 UG/DL
VIT B12 SERPL-MCNC: 612 PG/ML

## 2021-10-13 ENCOUNTER — APPOINTMENT (OUTPATIENT)
Dept: HEMATOLOGY ONCOLOGY | Facility: CLINIC | Age: 46
End: 2021-10-13
Payer: COMMERCIAL

## 2021-10-13 DIAGNOSIS — D51.8 OTHER VITAMIN B12 DEFICIENCY ANEMIAS: ICD-10-CM

## 2021-10-13 PROCEDURE — 99214 OFFICE O/P EST MOD 30 MIN: CPT

## 2021-10-13 NOTE — HISTORY OF PRESENT ILLNESS
[de-identified] : This is a 43 y/o premenopausal woman presenting for discussion on chemoprevention. Patient is s/p multiple prior biopsies, see note from dr lee in june 12, 2020. \par Pathology in 2015 was PASH on left. Pathology in may 2019 + PASH. \par Most recently she had a biopsy on left in june 2020 was  prolifereative FCC and hyperplasia.  She has never had any invasive or noninvasive cancer nor atypia. \par Patient is s/p hysterecotmy in may 2019 (but still has ovarian cancer) \par Patient has extesnive family hx of cancer \par She has  a high dom score , and is having mammogram alternating with mri. \par BRCA  negative \par \par \par started tamoxifen in june 2020 \par \par saw plastics saw dr jonas , put in small implant bilateral  in oct 21, 2020 \par will see dr lee in april 2021 \par mri of breast in april 2021 \par j\par  [de-identified] : adan and jimmy had great time \par friday going for mammo  will see jesus end of oct \par mri done in april 2021 \par tamoxifen going well no new complaints \par no period ( hysterecetomY ) \par hot flashes \par no headache or dizzy \par \par \par

## 2021-10-13 NOTE — ASSESSMENT
[FreeTextEntry1] : This is a 43 y/o premenopausal woman with a hx of  multiple prior breast biopsy and high dom score with strong family hx. \par \par 1) high risk breast cancer \par started tamoxiifen in june 2020 \par cont to tolerate tamoxifen well \par Continue to follow with Dr. Quintero and Dr. orosco \par MRI of the breast on April 2021 negative , due for mammogram  next week oct 2021 \par doing tamoxifen 20 mg a day  , no asa due to profuse  brusing ( even with low dose ) \par \par \par 2) family hx of cancer \par up todate on colonoscopy and gyn \par up todate  tv ultrasound \par s/p genetics \par \par 3) vit d \par cont vit d \par \par 4) bone density \par dexa ordered \par \par 5) iron def \par Repeat iron levels\par \par 6) b12 def \par Repeat  B12 level\par \par follow up in 6 months \par dw patient at length

## 2021-10-17 LAB
CANCER AG15-3 SERPL-ACNC: 11.8 U/ML
CEA SERPL-MCNC: 1 NG/ML
ESTRADIOL SERPL-MCNC: 36 PG/ML
FERRITIN SERPL-MCNC: 140 NG/ML
FOLATE SERPL-MCNC: 17.8 NG/ML
VIT B12 SERPL-MCNC: 552 PG/ML

## 2021-10-22 ENCOUNTER — APPOINTMENT (OUTPATIENT)
Dept: PLASTIC SURGERY | Facility: CLINIC | Age: 46
End: 2021-10-22
Payer: COMMERCIAL

## 2021-10-22 VITALS
DIASTOLIC BLOOD PRESSURE: 68 MMHG | SYSTOLIC BLOOD PRESSURE: 95 MMHG | OXYGEN SATURATION: 97 % | HEART RATE: 84 BPM | RESPIRATION RATE: 20 BRPM | TEMPERATURE: 98.9 F

## 2021-10-22 PROCEDURE — 99212 OFFICE O/P EST SF 10 MIN: CPT

## 2021-10-22 NOTE — HISTORY OF PRESENT ILLNESS
[FreeTextEntry1] : 1 year follow up after breast aug  all is well good shape and symmetry scars well healed no mass grade 1 capsules nl sensation . JOSE  has had uncomplicated recovery from surgery and anesthesia mammo ultrasound nl.

## 2021-10-22 NOTE — ASSESSMENT
[FreeTextEntry1] : pt is happy and doing well  The instructions were reviewed in detail with JOSE.  JOSE will return to the office for a post procedure visit annually

## 2021-10-25 ENCOUNTER — NON-APPOINTMENT (OUTPATIENT)
Age: 46
End: 2021-10-25

## 2022-03-21 ENCOUNTER — NON-APPOINTMENT (OUTPATIENT)
Age: 47
End: 2022-03-21

## 2022-03-22 ENCOUNTER — APPOINTMENT (OUTPATIENT)
Dept: BREAST CENTER | Facility: CLINIC | Age: 47
End: 2022-03-22
Payer: COMMERCIAL

## 2022-03-22 VITALS
SYSTOLIC BLOOD PRESSURE: 96 MMHG | DIASTOLIC BLOOD PRESSURE: 65 MMHG | BODY MASS INDEX: 23 KG/M2 | HEART RATE: 72 BPM | HEIGHT: 62 IN | WEIGHT: 125 LBS

## 2022-03-22 DIAGNOSIS — R92.8 OTHER ABNORMAL AND INCONCLUSIVE FINDINGS ON DIAGNOSTIC IMAGING OF BREAST: ICD-10-CM

## 2022-03-22 PROCEDURE — 99215 OFFICE O/P EST HI 40 MIN: CPT

## 2022-03-22 RX ORDER — AMOXICILLIN 500 MG/1
500 TABLET, FILM COATED ORAL
Qty: 4 | Refills: 3 | Status: DISCONTINUED | COMMUNITY
Start: 2021-10-22 | End: 2022-03-22

## 2022-03-22 RX ORDER — CYCLOBENZAPRINE HYDROCHLORIDE 10 MG/1
10 TABLET, FILM COATED ORAL 3 TIMES DAILY
Qty: 15 | Refills: 0 | Status: DISCONTINUED | COMMUNITY
Start: 2020-10-27 | End: 2022-03-22

## 2022-03-22 RX ORDER — ASPIRIN 81 MG
81 TABLET, DELAYED RELEASE (ENTERIC COATED) ORAL
Refills: 0 | Status: DISCONTINUED | COMMUNITY
End: 2022-03-22

## 2022-03-22 NOTE — PHYSICAL EXAM
[Normocephalic] : normocephalic [Atraumatic] : atraumatic [Supple] : supple [No Supraclavicular Adenopathy] : no supraclavicular adenopathy [Examined in the supine and seated position] : examined in the supine and seated position [Symmetrical] : symmetrical [No dominant masses] : no dominant masses in right breast  [No dominant masses] : no dominant masses left breast [No Nipple Retraction] : no left nipple retraction [No Nipple Discharge] : no left nipple discharge [No Axillary Lymphadenopathy] : no left axillary lymphadenopathy [No Edema] : no edema [No Rashes] : no rashes [No Ulceration] : no ulceration [de-identified] : implants soft, in place, no masses, excellent cosmesis [de-identified] : healed periareolar incision, no skin changes in area of patient's concern LOQ, no rash/no swelling [de-identified] : left arm ROM limited (pt has tear and frozen shoulder)

## 2022-03-22 NOTE — ASSESSMENT
[FreeTextEntry1] : 45 yo female with high risk\par plan bilateral mg/sono OCT 2022\par plan f/u MRI  april 2022\par cont tamoxifen with Dr. CASTANEDA\par f/u OCT 2022 as long as MRI is normal and her itchiness resolves\par also rec derm skin check annually\par rec unscented moisturizer and HC topically prn\par She knows to call or return sooner should any concerns or questions arise.\par

## 2022-03-22 NOTE — HISTORY OF PRESENT ILLNESS
[FreeTextEntry1] : This is a 46 year old female followed for high risk. She is s/p left stereotactic biopsy on 10/20/2016 done for increasing microcalcifications of the left breast lower outer quadrant. Pathology showed adenosis, sclerosing adenosis and involuting adenosis with scattered luminal calcifications. Path was found to be concordant and a f/u 6 month mammogram was recommended.  She is s/p left excisional biopsy in 11/2015. Path showed PASH and benign breast tissue. She has a history of bilateral excisional biopsies for fibroadenomas and a additional two core biopsies  of the left breast in 2002 and 2013.  Both areas were benign. She stated she first felt this lump on 9/2/15 and had negative imaging. She then had a breast MRI which showed an area at 10:00 of "suspicious enhancement" for which targeted ultrasound was recommended. This was done at [place] and a complicated cyst was found to correlate with the palpable finding and she was told to follow up in 6 months for ultrasound. On further workup, at left breast 10:00 2cm FN a circumscribed hypoechoic lesion correlates with the enhancing lesion seen on MRI at 10:00. IN addition at 7-8:00 in left breast 3cm FN adjacent to where the patient felt a lump a hypoechoic lesion with indistinct margins was seen and biopsy was recommended. The patient is s/p left 10:00 N2 USGBx 10/22/15, pathology shows nodular adenosis, with no atypia nor malignancy. At left -8:00 N3 pathology shows cystic change with associated papillary apocrine hyperplasia, no atypia nor malignancy. Patient is s/p left EBBx 11/12/15 for a palpable mass that was not seen on ultrasound. 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 \par \par Patient is s/p hip labrum tear repair on 09/29/2017. She is status post hysterectomy. On 4/9/19 she underwent left breast ultrasound-guided biopsy pathology showed benign intraductal papilloma with you D. age. Transected on one side. No atypia no malignancy.She then had screening breast MRI. On 4/25/19 she had left breast 10:00 ultrasound-guided biopsy which revealed benign results. And this was on an area as seen on her MRI with enhancement.On 5/6/19 she underwent right breast 7:00 MRI guided biopsy which had benign findings. And PAS H. and MRI biopsy right breast in upper inner quadrant which showed benign breast tissue and PASH.\par \par Status post left breast 3:00 excisional biopsy 6/6/19 pathology shows benign breast tissue with usual ductal hyperplasia, biopsy change present, no residual papilloma identified.She has no  breast complaints today.\par \par She is s/p left breast 2:00 6/4/2020 proliferative FCC, hyperplasia. She had contact dermatitis post op and saw Dr. Davis and was given steroids. States it is all resolved. She is s/p retropectoral silicone breast augmentation with DAP 10/2020.\par She started Jun 2020 Tamoxifen with Dr. Hebert. She had left arm Pfizer second dose 3/20/2021.\par \par She does not SBE. \par She has noticed a change in her breast or a breast lump.\par She has not noticed a change in her nipple or nipple area.\par She has noticed a change in the skin of the left breast. She is experiencing left breast and left axillary itchiness. started about a month ago, no changes to soaps, lotions/detergents. The itchiness was worse this past weekend.\par She is not experiencing nipple discharge.\par She is not experiencing breast pain.\par She has not noticed a lump or lymph node under the armpit. \par \par BREAST CANCER RISK FACTORS\par Menarche: 9\par Date of LMP: 2/15/2019\par Menopause: post\par Grav: 2  Para:  2 (Carlin and Valeriy)\par Age at first live birth: 32\par Nursed: no\par Hysterectomy: yes 3/5/2019\par Oophorectomy: no\par OCP: yes\par HRT: no\par Last pap/pelvic exam: 10/2019 WNL.\par Related family history: mom with atypia in her 40's\par Ashkenazi: no\par Mastery: BRCAPRO 10.7%, TCv6 10.8%, TCv7 26.3%, Sarah 28.8%\par BRCA testing: yes negative\par Bra size:  36B\par \par Last mammogram: 10/15/2021     Location: [place]\par Report reviewed.                                 Images reviewed on PACS.\par Results: Birads 2\par Dense breasts, No suspicious findings. \par \par Last ultrasound:  10/15/2021      Location: [place]\par Report reviewed.                                 Images reviewed on PACS.\par Results: Birads 2\par No sonographic evidence of malignancy.\par \par Last MRI:  4/8/2021                           Location: [facility] \par Report reviewed.                             Images reviewed on PACS.\par Results: Birads 2\par No MRI evidence

## 2022-04-05 NOTE — HISTORY OF PRESENT ILLNESS
[FreeTextEntry1] : This is a 45 year old female followed for high risk. She is s/p left stereotactic biopsy on 10/20/2016 done for increasing microcalcifications of the left breast lower outer quadrant. Pathology showed adenosis, sclerosing adenosis and involuting adenosis with scattered luminal calcifications. Path was found to be concordant and a f/u 6 month mammogram was recommended.  She is s/p left excisional biopsy in 11/2015. Path showed PASH and benign breast tissue. She has a history of bilateral excisional biopsies for fibroadenomas and a additional two core biopsies  of the left breast in 2002 and 2013.  Both areas were benign. She stated she first felt this lump on 9/2/15 and had negative imaging. She then had a breast MRI which showed an area at 10:00 of "suspicious enhancement" for which targeted ultrasound was recommended. This was done at [facility] and a complicated cyst was found to correlate with the palpable finding and she was told to follow up in 6 months for ultrasound. On further workup, at left breast 10:00 2cm FN a circumscribed hypoechoic lesion correlates with the enhancing lesion seen on MRI at 10:00. IN addition at 7-8:00 in left breast 3cm FN adjacent to where the patient felt a lump a hypoechoic lesion with indistinct margins was seen and biopsy was recommended. The patient is s/p left 10:00 N2 USGBx 10/22/15, pathology shows nodular adenosis, with no atypia nor malignancy. At left -8:00 N3 pathology shows cystic change with associated papillary apocrine hyperplasia, no atypia nor malignancy. Patient is s/p left EBBx 11/12/15 for a palpable mass that was not seen on ultrasound. 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 \par \par Patient is s/p hip labrum tear repair on 09/29/2017. She is status post hysterectomy. On 4/9/19 she underwent left breast ultrasound-guided biopsy pathology showed benign intraductal papilloma with you D. age. Transected on one side. No atypia no malignancy.She then had screening breast MRI. On 4/25/19 she had left breast 10:00 ultrasound-guided biopsy which revealed benign results. And this was on an area as seen on her MRI with enhancement.On 5/6/19 she underwent right breast 7:00 MRI guided biopsy which had benign findings. And PAS H. and MRI biopsy right breast in upper inner quadrant which showed benign breast tissue and PASH.\par \par Status post left breast 3:00 excisional biopsy 6/6/19 pathology shows benign breast tissue with usual ductal hyperplasia, biopsy change present, no residual papilloma identified.She has no  breast complaints today.\par \par She is s/p left breast 2:00 6/4/2020 proliferative FCC, hyperplasia. She had contact dermatitis post op and saw Dr. Davis and was given steroids. States it is all resolved. She is s/p retropectoral silicone breast augmentation with DAP 10/2020.\par She started Jun 2020 Tamoxifen with Dr. Hebert. She had left arm Pfizer second dose 3/20/2021.\par \par \par She does not SBE. \par She has noticed a change in her right breast or a breast lump on the right. Painful bruise on right breast.\par She has not noticed a change in her nipple or nipple area.\par She has noticed a change in the skin painful bruising of the right breast.\par She is not experiencing nipple discharge.\par She is experiencing right breast pain.\par She has not noticed a lump or lymph node under the armpit. \par \par BREAST CANCER RISK FACTORS\par Menarche: 9\par Date of LMP: 2/15/2019\par Menopause: post\par Grav: 2  Para:  2 (Carlin and Valeriy)\par Age at first live birth: 32\par Nursed: no\par Hysterectomy: yes 3/5/2019\par Oophorectomy: no\par OCP: yes\par HRT: no\par Last pap/pelvic exam: 10/2019 WNL.\par Related family history: mom with atypia in her 40's\par Ashkenazi: no\par Mastery: BRCAPRO 10.7%, TCv6 10.8%, TCv7 26.3%, Sarha 28.8%\par BRCA testing: yes negative\par Bra size:  36B\par \par Last mammogram: 10/8/2020 right     Location: [place]\par Report reviewed.                                 Images reviewed on PACS.\par Results: Birads 2\par No suspicious findings. \par \par Last ultrasound:  10/7/2020      Location: [place]\par Report reviewed.                                 Images reviewed on PACS.\par Results: Birads 0\par 2-3:00 in the left periareolar region hypoechoic scar. 10:00 in the left breast 2cm from the nipple 6 x 7 x 3mm\par \par Last MRI:  4/8/2021                           Location: [facility] \par Report reviewed.                                     Images reviewed on PACS.\par Results: Birads 2\par No MRI evidence
due to weakness/unable to perform

## 2022-04-08 ENCOUNTER — APPOINTMENT (OUTPATIENT)
Dept: HEMATOLOGY ONCOLOGY | Facility: CLINIC | Age: 47
End: 2022-04-08
Payer: COMMERCIAL

## 2022-04-08 VITALS
HEART RATE: 88 BPM | BODY MASS INDEX: 23 KG/M2 | WEIGHT: 125 LBS | DIASTOLIC BLOOD PRESSURE: 74 MMHG | HEIGHT: 62 IN | OXYGEN SATURATION: 100 % | RESPIRATION RATE: 18 BRPM | TEMPERATURE: 97.9 F | SYSTOLIC BLOOD PRESSURE: 114 MMHG

## 2022-04-08 DIAGNOSIS — R23.8 OTHER SKIN CHANGES: ICD-10-CM

## 2022-04-08 DIAGNOSIS — Z00.00 ENCOUNTER FOR GENERAL ADULT MEDICAL EXAMINATION W/OUT ABNORMAL FINDINGS: ICD-10-CM

## 2022-04-08 PROCEDURE — 99214 OFFICE O/P EST MOD 30 MIN: CPT

## 2022-04-11 LAB
25(OH)D3 SERPL-MCNC: 48.9 NG/ML
ESTRADIOL SERPL-MCNC: 765 PG/ML
FERRITIN SERPL-MCNC: 139 NG/ML
FOLATE SERPL-MCNC: >20 NG/ML
VIT B12 SERPL-MCNC: 611 PG/ML

## 2022-04-13 PROBLEM — R23.8 EASY BRUISING: Status: ACTIVE | Noted: 2020-07-22

## 2022-04-13 NOTE — REVIEW OF SYSTEMS
[Negative] : Allergic/Immunologic [Hot Flashes] : hot flashes [FreeTextEntry8] : has hx of 4cm left ovarian cyst- sees GYN - wants to check estradiol today. she is thinking about oopherectomy.

## 2022-04-13 NOTE — PHYSICAL EXAM
[Normal] : affect appropriate [de-identified] : b/l breasy symmetrical . obvious implants. no palpable masses, lesions, nodules , no adenopathy to b/l axilla.

## 2022-04-13 NOTE — HISTORY OF PRESENT ILLNESS
[de-identified] : This is a 45 y/o premenopausal woman presenting for discussion on chemoprevention. Patient is s/p multiple prior biopsies, see note from dr lee in june 12, 2020. \par Pathology in 2015 was PASH on left. Pathology in may 2019 + PASH. \par Most recently she had a biopsy on left in june 2020 was  prolifereative FCC and hyperplasia.  She has never had any invasive or noninvasive cancer nor atypia. \par Patient is s/p hysterecotmy in may 2019 (but still has ovarian cancer) \par Patient has extesnive family hx of cancer \par She has  a high dom score , and is having mammogram alternating with mri. \par BRCA  negative \par \par \par started tamoxifen in june 2020 \par \par saw plastics saw dr jonas , put in small implant bilateral  in oct 21, 2020 \par will see dr lee in april 2021 \par mri of breast in april 2021 \par j\par  [de-identified] : had issues with machine for MRI breast today- study was aborted due to technical difficulties. \par no period ( hysterecetomY - still has ovaries , just no uterus ) \par hot flashes \par itch to left arm pit stopped. \par no headache or dizzy \par \par 04/06/22- MRI breast ( terminated due to fat saturation failure). the breast parenchyma is composed of heterogenous fibroglandular tissue. bilateral retropectoral silicone breast implants are again present with no gross evidence of intra or extracapsular implant rupture ons agital T1 images. the post contrast series is nondiagnostic due to inhomogenous fat suppression. \par \par \par

## 2022-04-13 NOTE — ASSESSMENT
[FreeTextEntry1] : This is a 45 y/o premenopausal woman with a hx of  multiple prior breast biopsy and high dom score with strong family hx. \par \par 1) high risk breast cancer \par started tamoxiifen in june 2020 \par cont to tolerate tamoxifen well \par Continue to follow with Dr. Quintero and Dr. orosco \par MRI of the breast ( incomplete study) 04/06/22 , due for mammogram  10/2022\par doing tamoxifen 20 mg a day  , no asa due to profuse  bruising ( even with low dose ) \par repeat estradiol level today\par \par 2) family hx of cancer \par up todate on colonoscopy and gyn \par up todate  tv ultrasound \par s/p genetics \par \par 3) vit d \par cont vit d \par \par 4) bone density \par dexa 07/2021- osteopenia\par \par 5) iron def \par Repeat iron levels\par \par 6) b12 def \par Repeat  B12 level\par \par 7)hot flashes\par consider Vit E- aware of blood thinning properties\par \par follow up in 3 months \par dw patient at length

## 2022-04-19 ENCOUNTER — APPOINTMENT (OUTPATIENT)
Dept: BREAST CENTER | Facility: CLINIC | Age: 47
End: 2022-04-19
Payer: COMMERCIAL

## 2022-04-19 VITALS
BODY MASS INDEX: 23 KG/M2 | WEIGHT: 125 LBS | HEART RATE: 75 BPM | DIASTOLIC BLOOD PRESSURE: 65 MMHG | SYSTOLIC BLOOD PRESSURE: 105 MMHG | HEIGHT: 62 IN

## 2022-04-19 PROCEDURE — 99213 OFFICE O/P EST LOW 20 MIN: CPT

## 2022-04-19 NOTE — ASSESSMENT
[FreeTextEntry1] : 45 yo female with high risk\par plan bilateral mg/sono OCT 2022\par plan f/u MRI  april 2022-done today\par cont tamoxifen with Dr. CASTANEDA\par f/u OCT 2022 as long as MRI is normal\par also rec derm skin check annually\par \par She knows to call or return sooner should any concerns or questions arise.\par

## 2022-04-19 NOTE — PHYSICAL EXAM
[Normocephalic] : normocephalic [Atraumatic] : atraumatic [Supple] : supple [No Supraclavicular Adenopathy] : no supraclavicular adenopathy [Examined in the supine and seated position] : examined in the supine and seated position [Symmetrical] : symmetrical [No dominant masses] : no dominant masses in right breast  [No dominant masses] : no dominant masses left breast [No Nipple Retraction] : no left nipple retraction [No Nipple Discharge] : no left nipple discharge [No Axillary Lymphadenopathy] : no left axillary lymphadenopathy [No Edema] : no edema [No Rashes] : no rashes [No Ulceration] : no ulceration [de-identified] : implants soft, in place, no masses, excellent cosmesis [de-identified] : healed periareolar incision, no skin changes in area of patient's concern LOQ, no rash/no swelling [de-identified] : left arm ROM limited (pt has tear and frozen shoulder)

## 2022-04-19 NOTE — HISTORY OF PRESENT ILLNESS
[FreeTextEntry1] : This is a 46 year old female followed for high risk. She is s/p left stereotactic biopsy on 10/20/2016 done for increasing microcalcifications of the left breast lower outer quadrant. Pathology showed adenosis, sclerosing adenosis and involuting adenosis with scattered luminal calcifications. Path was found to be concordant and a f/u 6 month mammogram was recommended.  She is s/p left excisional biopsy in 11/2015. Path showed PASH and benign breast tissue. She has a history of bilateral excisional biopsies for fibroadenomas and a additional two core biopsies  of the left breast in 2002 and 2013.  Both areas were benign. She stated she first felt this lump on 9/2/15 and had negative imaging. She then had a breast MRI which showed an area at 10:00 of "suspicious enhancement" for which targeted ultrasound was recommended. This was done at [facility] and a complicated cyst was found to correlate with the palpable finding and she was told to follow up in 6 months for ultrasound. On further workup, at left breast 10:00 2cm FN a circumscribed hypoechoic lesion correlates with the enhancing lesion seen on MRI at 10:00. IN addition at 7-8:00 in left breast 3cm FN adjacent to where the patient felt a lump a hypoechoic lesion with indistinct margins was seen and biopsy was recommended. The patient is s/p left 10:00 N2 USGBx 10/22/15, pathology shows nodular adenosis, with no atypia nor malignancy. At left -8:00 N3 pathology shows cystic change with associated papillary apocrine hyperplasia, no atypia nor malignancy. Patient is s/p left EBBx 11/12/15 for a palpable mass that was not seen on ultrasound. 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 \par \par Patient is s/p hip labrum tear repair on 09/29/2017. She is status post hysterectomy. On 4/9/19 she underwent left breast ultrasound-guided biopsy pathology showed benign intraductal papilloma with you D. age. Transected on one side. No atypia no malignancy.She then had screening breast MRI. On 4/25/19 she had left breast 10:00 ultrasound-guided biopsy which revealed benign results. And this was on an area as seen on her MRI with enhancement.On 5/6/19 she underwent right breast 7:00 MRI guided biopsy which had benign findings. And PAS H. and MRI biopsy right breast in upper inner quadrant which showed benign breast tissue and PASH.\par \par Status post left breast 3:00 excisional biopsy 6/6/19 pathology shows benign breast tissue with usual ductal hyperplasia, biopsy change present, no residual papilloma identified.She has no  breast complaints today.\par \par She is s/p left breast 2:00 6/4/2020 proliferative FCC, hyperplasia. She had contact dermatitis post op and saw Dr. Davis and was given steroids. States it is all resolved. She is s/p retropectoral silicone breast augmentation with DAP 10/2020.\par She started Jun 2020 Tamoxifen with Dr. Hebert. She had left arm Pfizer second dose 3/20/2021.\par \par She does not SBE. \par She has noticed a change in her breast or a breast lump.\par She has not noticed a change in her nipple or nipple area.\par She has noticed a change in the skin of the left breast. She is experiencing left breast and left axillary itchiness. started about a month ago, no changes to soaps, lotions/detergents. The itchiness was worse this past weekend.\par She is not experiencing nipple discharge.\par She is not experiencing breast pain.\par She has not noticed a lump or lymph node under the armpit. \par \par BREAST CANCER RISK FACTORS\par Menarche: 9\par Date of LMP: 2/15/2019\par Menopause: post\par Grav: 2  Para:  2 (Carlin and Valeriy)\par Age at first live birth: 32\par Nursed: no\par Hysterectomy: yes 3/5/2019\par Oophorectomy: no\par OCP: yes\par HRT: no\par Last pap/pelvic exam: 10/2019 WNL.\par Related family history: mom with atypia in her 40's\par Ashkenazi: no\par Mastery: BRCAPRO 10.7%, TCv6 10.8%, TCv7 26.3%, Sarah 28.8%\par BRCA testing: yes negative\par Bra size:  36B\par \par Last mammogram: 10/15/2021     Location: [place]\par Report reviewed.                                 Images reviewed on PACS.\par Results: Birads 2\par Dense breasts, No suspicious findings. \par \par Last ultrasound:  10/15/2021      Location: [place]\par Report reviewed.                                 Images reviewed on PACS.\par Results: Birads 2\par No sonographic evidence of malignancy.\par \par Last MRI:  04/19/2022                           Location: [facility] \par Report unavailable for review.             Images reviewed on PACS.\par

## 2022-04-26 ENCOUNTER — NON-APPOINTMENT (OUTPATIENT)
Age: 47
End: 2022-04-26

## 2022-05-10 ENCOUNTER — APPOINTMENT (OUTPATIENT)
Dept: HEMATOLOGY ONCOLOGY | Facility: CLINIC | Age: 47
End: 2022-05-10

## 2022-06-14 DIAGNOSIS — M25.561 PAIN IN RIGHT KNEE: ICD-10-CM

## 2022-06-14 DIAGNOSIS — M25.562 PAIN IN RIGHT KNEE: ICD-10-CM

## 2022-06-15 ENCOUNTER — NON-APPOINTMENT (OUTPATIENT)
Age: 47
End: 2022-06-15

## 2022-07-12 ENCOUNTER — APPOINTMENT (OUTPATIENT)
Dept: HEMATOLOGY ONCOLOGY | Facility: CLINIC | Age: 47
End: 2022-07-12

## 2022-07-12 ENCOUNTER — RESULT REVIEW (OUTPATIENT)
Age: 47
End: 2022-07-12

## 2022-07-12 VITALS
DIASTOLIC BLOOD PRESSURE: 74 MMHG | RESPIRATION RATE: 18 BRPM | WEIGHT: 125 LBS | BODY MASS INDEX: 23 KG/M2 | OXYGEN SATURATION: 98 % | SYSTOLIC BLOOD PRESSURE: 109 MMHG | HEART RATE: 80 BPM | TEMPERATURE: 98.8 F | HEIGHT: 62 IN

## 2022-07-15 LAB
FERRITIN SERPL-MCNC: 140 NG/ML
FOLATE SERPL-MCNC: >20 NG/ML
VIT B12 SERPL-MCNC: 526 PG/ML

## 2022-07-19 LAB
25(OH)D3 SERPL-MCNC: 50.1 NG/ML
ESTRADIOL SERPL-MCNC: 677 PG/ML

## 2022-10-04 ENCOUNTER — RESULT REVIEW (OUTPATIENT)
Age: 47
End: 2022-10-04

## 2022-10-10 ENCOUNTER — NON-APPOINTMENT (OUTPATIENT)
Age: 47
End: 2022-10-10

## 2022-10-11 ENCOUNTER — RESULT REVIEW (OUTPATIENT)
Age: 47
End: 2022-10-11

## 2022-10-11 ENCOUNTER — APPOINTMENT (OUTPATIENT)
Dept: HEMATOLOGY ONCOLOGY | Facility: CLINIC | Age: 47
End: 2022-10-11

## 2022-10-11 VITALS
TEMPERATURE: 99.2 F | SYSTOLIC BLOOD PRESSURE: 108 MMHG | HEIGHT: 62 IN | DIASTOLIC BLOOD PRESSURE: 67 MMHG | BODY MASS INDEX: 24.55 KG/M2 | WEIGHT: 133.4 LBS | RESPIRATION RATE: 18 BRPM | HEART RATE: 83 BPM | OXYGEN SATURATION: 99 %

## 2022-10-11 DIAGNOSIS — Z79.810 LONG TERM (CURRENT) USE OF SELECTIVE ESTROGEN RECEPTOR MODULATORS (SERMS): ICD-10-CM

## 2022-10-11 PROCEDURE — 36415 COLL VENOUS BLD VENIPUNCTURE: CPT

## 2022-10-11 PROCEDURE — 99214 OFFICE O/P EST MOD 30 MIN: CPT | Mod: 25

## 2022-10-11 RX ORDER — TAMOXIFEN CITRATE 20 MG/1
20 TABLET, FILM COATED ORAL DAILY
Qty: 90 | Refills: 3 | Status: COMPLETED | COMMUNITY
Start: 2020-06-24 | End: 2022-10-11

## 2022-10-11 NOTE — HISTORY OF PRESENT ILLNESS
[de-identified] : This is a 45 y/o premenopausal woman presenting for discussion on chemoprevention. Patient is s/p multiple prior biopsies, see note from dr lee in june 12, 2020. \par Pathology in 2015 was PASH on left. Pathology in may 2019 + PASH. \par Most recently she had a biopsy on left in june 2020 was  prolifereative FCC and hyperplasia.  She has never had any invasive or noninvasive cancer nor atypia. \par Patient is s/p hysterecotmy in may 2019 (but still has ovarian cancer) \par Patient has extesnive family hx of cancer \par She has  a high dom score , and is having mammogram alternating with mri. \par BRCA  negative \par \par \par started tamoxifen in june 2020 \par \par saw plastics saw dr jonas , put in small implant bilateral  in oct 21, 2020 \par will see dr lee in april 2021 \par mri of breast in april 2021 \par j\par  [de-identified] : Patient seen and examined and here today for follow up\par doing well tolerating tamoxifen treatment This is year two.\par mammo and ultrasound done at [facility] last week \par Results were normal\par \par \par \par

## 2022-10-11 NOTE — PHYSICAL EXAM
[de-identified] : b/l breasy symmetrical . obvious implants. no palpable masses, lesions, nodules , no adenopathy to b/l axilla.

## 2022-10-11 NOTE — ASSESSMENT
[FreeTextEntry1] : This is a 47 y/o premenopausal woman with a hx of  multiple prior breast biopsy and high dom score with strong family hx. \par \par 1) high risk breast cancer \par started tamoxifen in june 2020 \par cont to tolerate tamoxifen well \par Continue to follow with Dr. Calhoun and Dr. orosco \par MRI of the breast ( incomplete study) 04/06/22\par doing tamoxifen 20 mg a day  , no asa due to profuse  bruising ( even with low dose ) \par mammo/sono 10/22 - no evidence of malignancy, repeat in 1 year 10 /23; MRI scheduled for 4/23.\par repeat estradiol level today\par \par 2) family hx of cancer \par [facility] - saw Dr. Spears - 7/2024\par pelvic ultrasound -no cysts on ovaries\par s/p genetics \par \par 3) vit d \par cont vit d \par \par 4) bone density \par dexa 07/2021- osteopenia - repeat 7/23\par continue vit D\par continue weight bearing exercise\par limit alcohol\par maintain healthy BMI\par \par 5) iron def \par Repeat iron levels\par \par 6) b12 def \par Repeat  B12 level\par \par 7)hot flashes\par consider Vit E- aware of blood thinning properties\par \par RTC in 6 months with cbc with diff, cmp, b12, iron, ferritin, vit D

## 2022-10-11 NOTE — REVIEW OF SYSTEMS
[FreeTextEntry8] : has hx of 4cm left ovarian cyst- sees GYN - wants to check estradiol today. she is thinking about oopherectomy.

## 2022-10-12 LAB
CANCER AG15-3 SERPL-ACNC: 12.4 U/ML
CEA SERPL-MCNC: <0.6 NG/ML
ESTRADIOL SERPL-MCNC: 684 PG/ML
FERRITIN SERPL-MCNC: 155 NG/ML
FOLATE SERPL-MCNC: >20 NG/ML
VIT B12 SERPL-MCNC: 507 PG/ML

## 2022-10-21 ENCOUNTER — APPOINTMENT (OUTPATIENT)
Dept: PLASTIC SURGERY | Facility: CLINIC | Age: 47
End: 2022-10-21

## 2022-10-21 VITALS
SYSTOLIC BLOOD PRESSURE: 98 MMHG | DIASTOLIC BLOOD PRESSURE: 66 MMHG | HEART RATE: 69 BPM | RESPIRATION RATE: 20 BRPM | TEMPERATURE: 98.4 F | OXYGEN SATURATION: 100 %

## 2022-10-21 PROCEDURE — 99212 OFFICE O/P EST SF 10 MIN: CPT

## 2022-10-21 NOTE — PHYSICAL EXAM
[NI] : Normal [de-identified] : sn n 19.5 r 20 cm l nl sens no mass grade 1 capsules well healed scars no lymphadenopathy

## 2022-10-21 NOTE — ASSESSMENT
[FreeTextEntry1] : JOSE is doing well The instructions were reviewed in detail with JOSE.  All of JOSE 's questions and concerns were addressed and answered completely JOSE will return to the office for a post procedure visit annually  rx for amoxacillin given for dental care prophylaxis

## 2022-10-21 NOTE — HISTORY OF PRESENT ILLNESS
[FreeTextEntry1] : Jose is here for her annual breast check as she has high risk for breast cancer and underwent implants 2 years ago.  she has had normal ultrasounds mammograms and mri 's and is scheduled for mri in the spring . compliant c amoxacillin with teeth cleanings . The risks, benefits, alternatives, limitations and the permanent scars were outlined with the patient. JOSE is considering replacement with larger implant . nl sens no mass grade 2 capsules bilateral no mass no lymphadenopathy

## 2023-03-24 ENCOUNTER — NON-APPOINTMENT (OUTPATIENT)
Age: 48
End: 2023-03-24

## 2023-04-12 ENCOUNTER — APPOINTMENT (OUTPATIENT)
Dept: HEMATOLOGY ONCOLOGY | Facility: CLINIC | Age: 48
End: 2023-04-12
Payer: COMMERCIAL

## 2023-04-12 ENCOUNTER — RESULT REVIEW (OUTPATIENT)
Age: 48
End: 2023-04-12

## 2023-04-12 VITALS
RESPIRATION RATE: 18 BRPM | OXYGEN SATURATION: 100 % | SYSTOLIC BLOOD PRESSURE: 120 MMHG | HEART RATE: 82 BPM | WEIGHT: 132 LBS | BODY MASS INDEX: 24.29 KG/M2 | TEMPERATURE: 98.7 F | DIASTOLIC BLOOD PRESSURE: 73 MMHG | HEIGHT: 62 IN

## 2023-04-12 PROCEDURE — 99214 OFFICE O/P EST MOD 30 MIN: CPT

## 2023-04-12 NOTE — PHYSICAL EXAM
A venogram was performed on the R POP VEIN AND SFV. Injected with multiple hand injections.  [Normal] : affect appropriate [de-identified] : b/l breast symmetrical with implants. no palpable masses or lesions b/l.  no adenopathy to b/l axilla. [de-identified] : normoactive bowel sounds. mild point tenderness upon palpation to LUQ. no radiating pain.

## 2023-04-12 NOTE — REVIEW OF SYSTEMS
[Hot Flashes] : hot flashes [Negative] : Allergic/Immunologic [Anxiety] : anxiety [FreeTextEntry7] : see interval hx

## 2023-04-12 NOTE — HISTORY OF PRESENT ILLNESS
[de-identified] : This is a 48 y/o premenopausal woman presenting for discussion on chemoprevention. Patient is s/p multiple prior biopsies, see note from dr lee in june 12, 2020. \par Pathology in 2015 was PASH on left. Pathology in may 2019 + PASH. \par Most recently she had a biopsy on left in june 2020 was  prolifereative FCC and hyperplasia.  She has never had any invasive or noninvasive cancer nor atypia. \par Patient is s/p hysterecotmy in may 2019 (but still has ovarian cancer) \par Patient has extesnive family hx of cancer \par She has  a high dom score , and is having mammogram alternating with mri. \par BRCA  negative \par \par \par started tamoxifen in june 2020 \par \par saw plastics saw dr jonas , put in small implant bilateral  in oct 21, 2020 \par will see dr lee in april 2021 \par mri of breast in april 2021 \par j\par  [de-identified] : Patient seen and examined and here today for follow up\par repeat dexa due 07/2023 \par [facility] 2024\par tamoxifen without aspirin - still tolerating well\par mammo/sono due 10/23-> MRI 04/23 scheduled \par has follow up keny may 2023 \par had episode of LUQ pain /pressure that travelled upwards to left shoulder x 1 day ago with dizziness and numbness in b/l hands- was driving had to pull over. \par

## 2023-04-12 NOTE — ASSESSMENT
[FreeTextEntry1] : This is a 46 y/o premenopausal woman with a hx of  multiple prior breast biopsy and high dom score with strong family hx. \par \par 1) high risk breast cancer \par started tamoxifen in june 2020 \par cont to tolerate tamoxifen well \par Continue to follow with Dr. Calhoun and Dr. orosco \par MRI of the breast ( incomplete study) 04/06/22\par doing tamoxifen 20 mg a day  , no asa due to profuse  bruising ( even with low dose ) \par mammo/sono 10/22 - no evidence of malignancy, repeat in 1 year 10 /23; MRI scheduled for 4/23.\par \par 2) family hx of cancer \par [facility] - saw Dr. Spears - 7/2024\par pelvic ultrasound -no cysts on ovaries ( has MRI pelvis per gyn)\par s/p genetics \par \par 3) vit d \par cont vit d \par \par 4) bone density \par dexa 07/2021- osteopenia - repeat 7/23- script given\par continue vit D\par continue weight bearing exercise\par limit alcohol\par maintain healthy BMI\par \par 5) iron def \par Repeat iron levels\par \par 6)hot flashes\par consider Vit E- aware of blood thinning properties\par \par 7)LUQ pain \par radiates upwards\par denies chest pain nor palpitations\par CMP reviewed. check lipase,amylase, ESR, CRP \par pt to follow up with PCP\par has appt with cardiology this afternoon. \par consider CT abdomen if cardio work up unremarkable and sxs persist or worsen. \par \par RTC in 6 months, sooner PRN

## 2023-04-17 ENCOUNTER — RESULT REVIEW (OUTPATIENT)
Age: 48
End: 2023-04-17

## 2023-04-18 ENCOUNTER — NON-APPOINTMENT (OUTPATIENT)
Age: 48
End: 2023-04-18

## 2023-04-18 LAB
25(OH)D3 SERPL-MCNC: 51.3 NG/ML
FERRITIN SERPL-MCNC: 100 NG/ML
FOLATE SERPL-MCNC: >20 NG/ML
IRON SATN MFR SERPL: 50 %
IRON SERPL-MCNC: 141 UG/DL
TIBC SERPL-MCNC: 283 UG/DL
UIBC SERPL-MCNC: 142 UG/DL
VIT B12 SERPL-MCNC: 573 PG/ML

## 2023-05-05 ENCOUNTER — APPOINTMENT (OUTPATIENT)
Dept: BREAST CENTER | Facility: CLINIC | Age: 48
End: 2023-05-05
Payer: COMMERCIAL

## 2023-05-05 VITALS
WEIGHT: 127 LBS | DIASTOLIC BLOOD PRESSURE: 61 MMHG | HEART RATE: 63 BPM | HEIGHT: 62 IN | BODY MASS INDEX: 23.37 KG/M2 | SYSTOLIC BLOOD PRESSURE: 99 MMHG

## 2023-05-05 DIAGNOSIS — Z80.0 FAMILY HISTORY OF MALIGNANT NEOPLASM OF DIGESTIVE ORGANS: ICD-10-CM

## 2023-05-05 DIAGNOSIS — N63.15 UNSP LUMP IN THE RT BREAST, OVERLAPPING QUADRANTS: ICD-10-CM

## 2023-05-05 DIAGNOSIS — N60.92 UNSPECIFIED BENIGN MAMMARY DYSPLASIA OF LEFT BREAST: ICD-10-CM

## 2023-05-05 DIAGNOSIS — Z80.8 FAMILY HISTORY OF MALIGNANT NEOPLASM OF OTHER ORGANS OR SYSTEMS: ICD-10-CM

## 2023-05-05 DIAGNOSIS — Z80.3 FAMILY HISTORY OF MALIGNANT NEOPLASM OF BREAST: ICD-10-CM

## 2023-05-05 PROCEDURE — 99214 OFFICE O/P EST MOD 30 MIN: CPT

## 2023-05-05 RX ORDER — AMOXICILLIN 500 MG/1
500 TABLET, FILM COATED ORAL
Qty: 4 | Refills: 3 | Status: DISCONTINUED | COMMUNITY
Start: 2022-10-21 | End: 2023-05-05

## 2023-05-05 NOTE — PHYSICAL EXAM
[Normocephalic] : normocephalic [Atraumatic] : atraumatic [Supple] : supple [No Supraclavicular Adenopathy] : no supraclavicular adenopathy [Examined in the supine and seated position] : examined in the supine and seated position [Symmetrical] : symmetrical [No dominant masses] : no dominant masses left breast [No Nipple Retraction] : no left nipple retraction [No Nipple Discharge] : no left nipple discharge [No Axillary Lymphadenopathy] : no left axillary lymphadenopathy [No Edema] : no edema [No Rashes] : no rashes [No Ulceration] : no ulceration [de-identified] : implants soft, in place, no masses, excellent cosmesis [de-identified] : there is a nodularity 9 N2-3 likely cyst [de-identified] : healed periareolar incision, no rash/no swelling [de-identified] : left arm ROM limited (pt has tear and frozen shoulder)

## 2023-05-05 NOTE — ASSESSMENT
[FreeTextEntry1] : 48 yo female with high risk\par new right breast palp nodule \par plan targeted right breast u/s asap\par likely cyst\par if negative\par \par plan bilateral mg/sono OCT 2023\par plan MRI  april 2024\par cont tamoxifen with Dr. Castillo\par \par also rec derm skin check annually\par f/u 1 year as long as imaging negative\par She knows to call or return sooner should any concerns or questions arise.\par

## 2023-05-05 NOTE — HISTORY OF PRESENT ILLNESS
[FreeTextEntry1] : This is a 47 year old female followed for high risk. She is s/p left stereotactic biopsy on 10/20/2016 done for increasing microcalcifications of the left breast lower outer quadrant. Pathology showed adenosis, sclerosing adenosis and involuting adenosis with scattered luminal calcifications. Path was found to be concordant and a f/u 6 month mammogram was recommended.  She is s/p left excisional biopsy in 11/2015. Path showed PASH and benign breast tissue. She has a history of bilateral excisional biopsies for fibroadenomas and a additional two core biopsies  of the left breast in 2002 and 2013.  Both areas were benign. She stated she first felt this lump on 9/2/15 and had negative imaging. She then had a breast MRI which showed an area at 10:00 of "suspicious enhancement" for which targeted ultrasound was recommended. This was done at [facility] and a complicated cyst was found to correlate with the palpable finding and she was told to follow up in 6 months for ultrasound. On further workup, at left breast 10:00 2cm FN a circumscribed hypoechoic lesion correlates with the enhancing lesion seen on MRI at 10:00. IN addition at 7-8:00 in left breast 3cm FN adjacent to where the patient felt a lump a hypoechoic lesion with indistinct margins was seen and biopsy was recommended. The patient is s/p left 10:00 N2 USGBx 10/22/15, pathology shows nodular adenosis, with no atypia nor malignancy. At left -8:00 N3 pathology shows cystic change with associated papillary apocrine hyperplasia, no atypia nor malignancy. Patient is s/p left EBBx 11/12/15 for a palpable mass that was not seen on ultrasound. 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 \par \par Patient is s/p hip labrum tear repair on 09/29/2017. She is status post hysterectomy. On 4/9/19 she underwent left breast ultrasound-guided biopsy pathology showed benign intraductal papilloma with you D. age. Transected on one side. No atypia no malignancy.She then had screening breast MRI. On 4/25/19 she had left breast 10:00 ultrasound-guided biopsy which revealed benign results. And this was on an area as seen on her MRI with enhancement.On 5/6/19 she underwent right breast 7:00 MRI guided biopsy which had benign findings. And PAS H. and MRI biopsy right breast in upper inner quadrant which showed benign breast tissue and PASH.\par \par Status post left breast 3:00 excisional biopsy 6/6/19 pathology shows benign breast tissue with usual ductal hyperplasia, biopsy change present, no residual papilloma identified.She has no  breast complaints today.\par \par She is s/p left breast 2:00 6/4/2020 proliferative FCC, hyperplasia. She had contact dermatitis post op and saw Dr. Davis and was given steroids. States it is all resolved. She is s/p retropectoral silicone breast augmentation with DAP 10/2020.\par \par She started Jun 2020 Tamoxifen with Dr. Castillo. She had left arm Pfizer second dose 3/20/2021.\par \par She does not SBE. \par She has noticed a change in her breast or a breast lump.\par She has not noticed a change in her nipple or nipple area.\par She has noticed a change in the skin of the left breast. She is experiencing left breast and left axillary itchiness. started about a month ago, no changes to soaps, lotions/detergents. The itchiness was worse this past weekend.\par She is not experiencing nipple discharge.\par She is not experiencing breast pain.\par She has not noticed a lump or lymph node under the armpit. \par \par BREAST CANCER RISK FACTORS\par Menarche: 9\par Date of LMP: 2/15/2019\par Menopause: post\par Grav: 2  Para:  2 (Carlin and Valeriy)\par Age at first live birth: 32\par Nursed: no\par Hysterectomy: yes 3/5/2019\par Oophorectomy: no\par OCP: yes\par HRT: no\par Last pap/pelvic exam: 10/2019 WNL.\par Related family history: mom with atypia in her 40's\par Ashkenazi: no\par Mastery: BRCAPRO 10.7%, TCv6 10.8%, TCv7 26.3%, Sarah 28.8%\par BRCA testing: yes negative\par Bra size:  36B\par \par Last mammogram: 10/5/2022                Location: [facility]\par Report reviewed.                                 Images reviewed on PACS.\par Results: Birads 2\par Dense breasts, No suspicious findings. \par \par Last ultrasound:  10/5/2022                 Location: [place]\par Report reviewed.                                 Images reviewed on PACS.\par Results: Birads 2\par No sonographic evidence of malignancy. Scattered benign cysts are noted, measuring up to 0.4cm. 2-3:00, periareolar region-minimal architectural distortion is noted underlying the dermal scar. \par \par Last MRI:  04/18/2023                         Location: [facility]\par Report  reviewed                 .             Images reviewed on PACS.\par Results: BI-RADS 2\par Bilateral retropectoral silicone breast implants with no MRI evidence of intra or extracapsular implant rupture. No MRI evidence of malignancy.

## 2023-05-07 ENCOUNTER — RESULT REVIEW (OUTPATIENT)
Age: 48
End: 2023-05-07

## 2023-05-09 ENCOUNTER — NON-APPOINTMENT (OUTPATIENT)
Age: 48
End: 2023-05-09

## 2023-05-31 ENCOUNTER — LABORATORY RESULT (OUTPATIENT)
Age: 48
End: 2023-05-31

## 2023-05-31 ENCOUNTER — APPOINTMENT (OUTPATIENT)
Dept: HEMATOLOGY ONCOLOGY | Facility: CLINIC | Age: 48
End: 2023-05-31

## 2023-05-31 VITALS
DIASTOLIC BLOOD PRESSURE: 66 MMHG | HEIGHT: 62 IN | BODY MASS INDEX: 24.11 KG/M2 | SYSTOLIC BLOOD PRESSURE: 103 MMHG | HEART RATE: 98 BPM | RESPIRATION RATE: 18 BRPM | TEMPERATURE: 98.5 F | WEIGHT: 131 LBS | OXYGEN SATURATION: 99 %

## 2023-05-31 DIAGNOSIS — C50.919 MALIGNANT NEOPLASM OF UNSPECIFIED SITE OF UNSPECIFIED FEMALE BREAST: ICD-10-CM

## 2023-06-02 LAB — ESTRADIOL SERPL-MCNC: 70 PG/ML

## 2023-07-13 DIAGNOSIS — M25.552 PAIN IN LEFT HIP: ICD-10-CM

## 2023-07-14 ENCOUNTER — NON-APPOINTMENT (OUTPATIENT)
Age: 48
End: 2023-07-14

## 2023-07-17 ENCOUNTER — NON-APPOINTMENT (OUTPATIENT)
Age: 48
End: 2023-07-17

## 2023-10-03 ENCOUNTER — RESULT REVIEW (OUTPATIENT)
Age: 48
End: 2023-10-03

## 2023-10-05 DIAGNOSIS — E61.1 IRON DEFICIENCY: ICD-10-CM

## 2023-10-06 ENCOUNTER — RESULT REVIEW (OUTPATIENT)
Age: 48
End: 2023-10-06

## 2023-10-06 ENCOUNTER — APPOINTMENT (OUTPATIENT)
Dept: HEMATOLOGY ONCOLOGY | Facility: CLINIC | Age: 48
End: 2023-10-06
Payer: COMMERCIAL

## 2023-10-06 VITALS
SYSTOLIC BLOOD PRESSURE: 107 MMHG | RESPIRATION RATE: 16 BRPM | WEIGHT: 131.9 LBS | BODY MASS INDEX: 24.27 KG/M2 | HEART RATE: 74 BPM | OXYGEN SATURATION: 100 % | TEMPERATURE: 99.1 F | DIASTOLIC BLOOD PRESSURE: 67 MMHG | HEIGHT: 62 IN

## 2023-10-06 DIAGNOSIS — E55.9 VITAMIN D DEFICIENCY, UNSPECIFIED: ICD-10-CM

## 2023-10-06 DIAGNOSIS — M85.80 OTHER SPECIFIED DISORDERS OF BONE DENSITY AND STRUCTURE, UNSPECIFIED SITE: ICD-10-CM

## 2023-10-06 DIAGNOSIS — R10.9 UNSPECIFIED ABDOMINAL PAIN: ICD-10-CM

## 2023-10-06 PROCEDURE — 99214 OFFICE O/P EST MOD 30 MIN: CPT | Mod: 25

## 2023-10-06 PROCEDURE — 36415 COLL VENOUS BLD VENIPUNCTURE: CPT

## 2023-10-06 RX ORDER — PEDIATRIC MULTIVITAMIN NO.17
TABLET,CHEWABLE ORAL
Refills: 0 | Status: ACTIVE | COMMUNITY

## 2023-10-06 RX ORDER — GUAR GUM 1 G
TABLET,CHEWABLE ORAL
Refills: 0 | Status: ACTIVE | COMMUNITY

## 2023-10-10 ENCOUNTER — APPOINTMENT (OUTPATIENT)
Dept: HEMATOLOGY ONCOLOGY | Facility: CLINIC | Age: 48
End: 2023-10-10

## 2023-10-27 ENCOUNTER — APPOINTMENT (OUTPATIENT)
Dept: PLASTIC SURGERY | Facility: HOSPITAL | Age: 48
End: 2023-10-27
Payer: COMMERCIAL

## 2023-10-27 VITALS
TEMPERATURE: 98.3 F | SYSTOLIC BLOOD PRESSURE: 100 MMHG | OXYGEN SATURATION: 100 % | RESPIRATION RATE: 20 BRPM | HEART RATE: 76 BPM | DIASTOLIC BLOOD PRESSURE: 68 MMHG

## 2023-10-27 PROCEDURE — 99203 OFFICE O/P NEW LOW 30 MIN: CPT

## 2023-11-28 ENCOUNTER — TRANSCRIPTION ENCOUNTER (OUTPATIENT)
Age: 48
End: 2023-11-28

## 2023-11-29 ENCOUNTER — TRANSCRIPTION ENCOUNTER (OUTPATIENT)
Age: 48
End: 2023-11-29

## 2024-01-09 ENCOUNTER — RESULT REVIEW (OUTPATIENT)
Age: 49
End: 2024-01-09

## 2024-01-10 ENCOUNTER — NON-APPOINTMENT (OUTPATIENT)
Age: 49
End: 2024-01-10

## 2024-01-11 DIAGNOSIS — Z98.82 BREAST IMPLANT STATUS: ICD-10-CM

## 2024-01-11 RX ORDER — AMOXICILLIN 500 MG/1
500 TABLET, FILM COATED ORAL
Qty: 4 | Refills: 3 | Status: ACTIVE | COMMUNITY
Start: 2024-01-11 | End: 1900-01-01

## 2024-01-17 ENCOUNTER — APPOINTMENT (OUTPATIENT)
Dept: HEMATOLOGY ONCOLOGY | Facility: CLINIC | Age: 49
End: 2024-01-17
Payer: COMMERCIAL

## 2024-01-17 VITALS
RESPIRATION RATE: 16 BRPM | OXYGEN SATURATION: 100 % | HEART RATE: 69 BPM | BODY MASS INDEX: 24.85 KG/M2 | DIASTOLIC BLOOD PRESSURE: 67 MMHG | HEIGHT: 62 IN | TEMPERATURE: 98 F | SYSTOLIC BLOOD PRESSURE: 105 MMHG | WEIGHT: 135.06 LBS

## 2024-01-17 DIAGNOSIS — R53.83 OTHER FATIGUE: ICD-10-CM

## 2024-01-17 DIAGNOSIS — M79.89 OTHER SPECIFIED SOFT TISSUE DISORDERS: ICD-10-CM

## 2024-01-17 DIAGNOSIS — D64.9 ANEMIA, UNSPECIFIED: ICD-10-CM

## 2024-01-17 DIAGNOSIS — N83.209 UNSPECIFIED OVARIAN CYST, UNSPECIFIED SIDE: ICD-10-CM

## 2024-01-17 PROCEDURE — 99214 OFFICE O/P EST MOD 30 MIN: CPT

## 2024-01-17 RX ORDER — TAMOXIFEN CITRATE 20 MG/1
20 TABLET, FILM COATED ORAL DAILY
Qty: 30 | Refills: 5 | Status: ACTIVE | COMMUNITY
Start: 2021-01-11 | End: 1900-01-01

## 2024-01-17 RX ORDER — GABAPENTIN 100 MG
100 TABLET ORAL
Refills: 0 | Status: ACTIVE | COMMUNITY

## 2024-01-17 NOTE — HISTORY OF PRESENT ILLNESS
[de-identified] : This is a 48 y/o premenopausal woman presenting for discussion on chemoprevention. Patient is s/p multiple prior biopsies, see note from dr lee in june 12, 2020. \par  Pathology in 2015 was PASH on left. Pathology in may 2019 + PASH. \par  Most recently she had a biopsy on left in june 2020 was  prolifereative FCC and hyperplasia.  She has never had any invasive or noninvasive cancer nor atypia. \par  Patient is s/p hysterecotmy in may 2019 (but still has ovarian cancer) \par  Patient has extesnive family hx of cancer \par  She has  a high dom score , and is having mammogram alternating with mri. \par  BRCA  negative \par  \par  \par  started tamoxifen in june 2020 \par  \par  saw plastics saw dr jonas , put in small implant bilateral  in oct 21, 2020 \par  will see dr lee in april 2021 \par  mri of breast in april 2021 \par  j\par   [de-identified] : Patient seen and examined and here today for follow up States overall feels well dexa performed in 07/2023 - states results were stable tamoxifen without aspirin - still tolerating well mammo/sono performed on 10/3/23 Had surgery to remove her ovaries in 11/28/23 Had 3 month f/u US on 01/10/24 - results were stable, need to go for biopsy on January 26

## 2024-01-17 NOTE — ASSESSMENT
[FreeTextEntry1] : This is a 46 y/o premenopausal woman with a hx of multiple prior breast biopsy and high dom score with strong family hx.  # high risk breast cancer started tamoxifen in june 2020 cont to tolerate tamoxifen well Continue to follow with Dr. Calhoun and Dr. orosco MRI of the breast ( incomplete study) 04/06/22 continue tamoxifen 20 mg a day, no asa due to profuse bruising ( even with low dose ) mammo/sono 10/22 - no evidence of malignancy, repeat in 1 year 10 /23; MRI scheduled for 4/23. 10/3/23 - reviewed mammo/sono At 1:00 in the left retroareolar region there is a new lobulated hypoechoic lesion with internal color flow that could represent a papilloma or focal fibrocystic change. However, left breast ultrasound-guided biopsy is necessary at the current time to determine histology and exclude malignancy. She does not want this and wants to wait 3 months and get repeat US prior to biopsy. She is also considering TAHBSO. Due for April 2024 1/16/24- vs and labs reviewed from 1/9/24. hgb 11.9 monitor. US from 1/10/24 reviewed - recommending biopsy. Emailed Dr. Ward as patient would like to get MRI to determine whether there are any other abnormalities that would need to be biopsied. continue Tamoxifen for now. she is s/p BSO, she had a hysterectomy previously. Follows with Dr. Lal.  # family hx of cancer [facility] - saw Dr. 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

## 2024-01-17 NOTE — PHYSICAL EXAM
[Normal] : affect appropriate [de-identified] : b/l breast symmetrical with implants. no palpable masses or lesions b/l.  no adenopathy to b/l axilla. [de-identified] : normoactive bowel sounds. mild point tenderness upon palpation to LUQ. no radiating pain.

## 2024-01-17 NOTE — REVIEW OF SYSTEMS
[Night Sweats] : night sweats [Abdominal Pain] : abdominal pain [Hot Flashes] : hot flashes [FreeTextEntry7] : right lower abdominal pain, states she has cyst on ovary [Lower Ext Edema] : lower extremity edema [Joint Pain] : joint pain [Joint Stiffness] : joint stiffness [Muscle Weakness] : muscle weakness [Negative] : Gastrointestinal [FreeTextEntry5] : increased swelling in the left knee down to the leg that increased in October from possibly twisting knee [FreeTextEntry9] : left knee and left hip

## 2024-01-22 ENCOUNTER — RESULT REVIEW (OUTPATIENT)
Age: 49
End: 2024-01-22

## 2024-01-26 ENCOUNTER — NON-APPOINTMENT (OUTPATIENT)
Age: 49
End: 2024-01-26

## 2024-03-25 ENCOUNTER — NON-APPOINTMENT (OUTPATIENT)
Age: 49
End: 2024-03-25

## 2024-04-11 ENCOUNTER — RESULT REVIEW (OUTPATIENT)
Age: 49
End: 2024-04-11

## 2024-05-20 ENCOUNTER — APPOINTMENT (OUTPATIENT)
Dept: BREAST CENTER | Facility: CLINIC | Age: 49
End: 2024-05-20
Payer: COMMERCIAL

## 2024-05-20 VITALS
WEIGHT: 128 LBS | SYSTOLIC BLOOD PRESSURE: 97 MMHG | HEIGHT: 62 IN | BODY MASS INDEX: 23.55 KG/M2 | DIASTOLIC BLOOD PRESSURE: 65 MMHG | HEART RATE: 76 BPM

## 2024-05-20 DIAGNOSIS — R92.30 DENSE BREASTS, UNSPECIFIED: ICD-10-CM

## 2024-05-20 DIAGNOSIS — D24.2 BENIGN NEOPLASM OF LEFT BREAST: ICD-10-CM

## 2024-05-20 DIAGNOSIS — Z91.89 OTHER SPECIFIED PERSONAL RISK FACTORS, NOT ELSEWHERE CLASSIFIED: ICD-10-CM

## 2024-05-20 DIAGNOSIS — Z12.31 ENCOUNTER FOR SCREENING MAMMOGRAM FOR MALIGNANT NEOPLASM OF BREAST: ICD-10-CM

## 2024-05-20 PROCEDURE — 99214 OFFICE O/P EST MOD 30 MIN: CPT

## 2024-05-20 RX ORDER — CELECOXIB 100 MG/1
100 CAPSULE ORAL TWICE DAILY
Refills: 0 | Status: DISCONTINUED | COMMUNITY
End: 2024-05-20

## 2024-05-20 NOTE — ASSESSMENT
[FreeTextEntry1] : 49 yo female with high risk  plan bilateral mg/sono OCT 2024 plan MRI  april 2025 cont tamoxifen with Dr. Castillo  also rec derm skin check annually f/u 1 year as long as imaging negative She knows to call or return sooner should any concerns or questions arise.

## 2024-05-20 NOTE — HISTORY OF PRESENT ILLNESS
[FreeTextEntry1] : This is a 48 year old female followed for high risk- she has had multiple breast biopsies and excisional biopsies.   She is s/p left stereotactic biopsy on 10/20/2016 pathology showed adenosis, sclerosing adenosis and involuting adenosis with scattered luminal calcifications- concordant. She has a history of bilateral excisional biopsies for fibroadenomas and an additional two core biopsies of the left breast in 2002 and 2013, pathology benign. She felt this lump on 9/2015 and had negative imaging. She then had a breast MRI which showed an area at 10:00 of "suspicious enhancement" for which targeted ultrasound was recommended. This was done at [place] and a complicated cyst was found to correlate with the palpable finding and she was told to follow up in 6 months for ultrasound. On further workup, at left breast 10:00 2cm FN a circumscribed hypoechoic lesion correlates with the enhancing lesion seen on MRI at 10:00. In addition, at 7-8:00 in left breast 3cm FN adjacent to where the patient felt a lump a hypoechoic lesion with indistinct margins was seen and biopsy was recommended. Left 10:00 N2 USGBx 10/22/15, pathology shows nodular adenosis, with no atypia nor malignancy. At left -8:00 N3 pathology shows cystic change with associated papillary apocrine hyperplasia, no atypia nor malignancy. Patient is s/p left EBBx 11/12/15 for a palpable mass that was not seen on ultrasound, final pathology shows left 9:00 PASH.   On 4/9/19 she underwent left breast ultrasound-guided biopsy pathology showed benign intraductal papilloma, transected on one side.4/25/19 she had left breast 10:00 ultrasound-guided biopsy which revealed benign results. On 5/6/19 she underwent right breast 7:00 MRI guided biopsy which had benign findings. And PASH. and MRI biopsy right breast in upper inner quadrant which showed benign breast tissue and PASH. 6/6/2 019 status post left breast 3:00 excisional biopsy -pathology shows benign breast tissue with usual ductal hyperplasia, biopsy change present, no residual papilloma identified.  Patient is s/p hip labrum tear repair on 09/29/2017. She is status post hysterectomy.    6/4/2020 left breast 2:00 bx, pathology shows proliferative FCC, hyperplasia.   She had contact dermatitis post op and saw Dr. Davis and was given steroids. States it is all resolved.   She is s/p retropectoral silicone breast augmentation with DAP 10/2020.  Jun 2020 began Tamoxifen with Dr. Castillo. She had left arm Pfizer second dose 3/20/2021.  She does not SBE.  She has noticed a change in her breast or a breast lump. She has not noticed a change in her nipple or nipple area. She has not noticed a change in the skin of the breast.Her itchiness resolved. She is not experiencing nipple discharge. She is not experiencing breast pain. She has not noticed a lump or lymph node under the armpit.   BREAST CANCER RISK FACTORS Menarche: 9 Date of LMP: 2/15/2019 Menopause: post Grav: 2  Para:  2 (Carlin and Valeriy) Age at first live birth: 32 Nursed: no Hysterectomy: yes 3/5/2019 Oophorectomy: no OCP: yes HRT: no Last pap/pelvic exam: 10/2019 WNL. Related family history: mom with atypia in her 40's Ashkenazi: no Mastery: BRCAPRO 10.7%, TCv6 10.8%, TCv7 26.3%, Sarah 28.8% BRCA testing: yes negative Bra size:  36B  Last mammogram: 10/4/2023               Location: [facility] Report reviewed.                                 Images reviewed on PACS. Results: negative The breasts are heterogeneously dense.  Last ultrasound:  10/4/2023                Location: [facility] Report reviewed.                                 Images reviewed on PACS. Results: Birads 4-At 1:00 in the left retroareolar region there is a new lobulated hypoechoic lesion with internal color flow that could represent a papilloma or focal fibrocystic change- US bx recommended.  Last MRI:  04/12/2023                   Location: [facility] Report  reviewed.                    Images reviewed on PACS. Results: BI-RADS 2 No MRI evidence of malignancy. Intact bilateral implants. Intact bilateral implants.

## 2024-05-20 NOTE — PHYSICAL EXAM
[de-identified] : implants soft, in place, no masses, excellent cosmesis [de-identified] : there is a nodularity 9 N2-3 likely cyst [de-identified] : healed periareolar incision, no rash/no swelling [de-identified] : left arm ROM limited (pt has tear and frozen shoulder)

## 2024-06-19 ENCOUNTER — NON-APPOINTMENT (OUTPATIENT)
Age: 49
End: 2024-06-19

## 2024-07-09 ENCOUNTER — NON-APPOINTMENT (OUTPATIENT)
Age: 49
End: 2024-07-09

## 2024-07-15 ENCOUNTER — NON-APPOINTMENT (OUTPATIENT)
Age: 49
End: 2024-07-15

## 2024-07-17 ENCOUNTER — RESULT REVIEW (OUTPATIENT)
Age: 49
End: 2024-07-17

## 2024-07-17 ENCOUNTER — APPOINTMENT (OUTPATIENT)
Dept: HEMATOLOGY ONCOLOGY | Facility: CLINIC | Age: 49
End: 2024-07-17

## 2024-07-17 VITALS
HEART RATE: 69 BPM | HEIGHT: 62 IN | BODY MASS INDEX: 23.83 KG/M2 | TEMPERATURE: 97.9 F | SYSTOLIC BLOOD PRESSURE: 111 MMHG | DIASTOLIC BLOOD PRESSURE: 68 MMHG | WEIGHT: 129.5 LBS | OXYGEN SATURATION: 100 % | RESPIRATION RATE: 16 BRPM

## 2024-07-17 DIAGNOSIS — Z91.89 OTHER SPECIFIED PERSONAL RISK FACTORS, NOT ELSEWHERE CLASSIFIED: ICD-10-CM

## 2024-07-17 DIAGNOSIS — R23.2 FLUSHING: ICD-10-CM

## 2024-07-17 DIAGNOSIS — D64.9 ANEMIA, UNSPECIFIED: ICD-10-CM

## 2024-07-17 PROCEDURE — 36415 COLL VENOUS BLD VENIPUNCTURE: CPT

## 2024-07-17 PROCEDURE — 99214 OFFICE O/P EST MOD 30 MIN: CPT

## 2024-07-17 RX ORDER — FEZOLINETANT 45 MG/1
45 TABLET, FILM COATED ORAL
Qty: 90 | Refills: 1 | Status: ACTIVE | COMMUNITY
Start: 2024-07-17 | End: 1900-01-01

## 2024-09-05 ENCOUNTER — NON-APPOINTMENT (OUTPATIENT)
Age: 49
End: 2024-09-05

## 2024-10-01 ENCOUNTER — RESULT REVIEW (OUTPATIENT)
Age: 49
End: 2024-10-01

## 2025-01-14 ENCOUNTER — NON-APPOINTMENT (OUTPATIENT)
Age: 50
End: 2025-01-14

## 2025-01-14 ENCOUNTER — APPOINTMENT (OUTPATIENT)
Dept: HEMATOLOGY ONCOLOGY | Facility: CLINIC | Age: 50
End: 2025-01-14

## 2025-01-15 ENCOUNTER — APPOINTMENT (OUTPATIENT)
Dept: HEMATOLOGY ONCOLOGY | Facility: CLINIC | Age: 50
End: 2025-01-15
Payer: COMMERCIAL

## 2025-01-15 ENCOUNTER — RESULT REVIEW (OUTPATIENT)
Age: 50
End: 2025-01-15

## 2025-01-15 ENCOUNTER — NON-APPOINTMENT (OUTPATIENT)
Age: 50
End: 2025-01-15

## 2025-01-15 VITALS
SYSTOLIC BLOOD PRESSURE: 120 MMHG | DIASTOLIC BLOOD PRESSURE: 71 MMHG | TEMPERATURE: 98 F | BODY MASS INDEX: 23.92 KG/M2 | WEIGHT: 130 LBS | RESPIRATION RATE: 16 BRPM | OXYGEN SATURATION: 99 % | HEIGHT: 62 IN | HEART RATE: 64 BPM

## 2025-01-15 DIAGNOSIS — Z91.89 OTHER SPECIFIED PERSONAL RISK FACTORS, NOT ELSEWHERE CLASSIFIED: ICD-10-CM

## 2025-01-15 PROCEDURE — 36415 COLL VENOUS BLD VENIPUNCTURE: CPT

## 2025-01-15 PROCEDURE — 99214 OFFICE O/P EST MOD 30 MIN: CPT | Mod: 25

## 2025-01-15 PROCEDURE — 99204 OFFICE O/P NEW MOD 45 MIN: CPT | Mod: 25

## 2025-01-17 ENCOUNTER — RESULT REVIEW (OUTPATIENT)
Age: 50
End: 2025-01-17

## 2025-03-25 ENCOUNTER — NON-APPOINTMENT (OUTPATIENT)
Age: 50
End: 2025-03-25

## 2025-04-22 ENCOUNTER — RESULT REVIEW (OUTPATIENT)
Age: 50
End: 2025-04-22

## 2025-06-16 ENCOUNTER — APPOINTMENT (OUTPATIENT)
Dept: BREAST CENTER | Facility: CLINIC | Age: 50
End: 2025-06-16
Payer: COMMERCIAL

## 2025-06-16 VITALS
SYSTOLIC BLOOD PRESSURE: 101 MMHG | WEIGHT: 127 LBS | HEART RATE: 76 BPM | BODY MASS INDEX: 23.37 KG/M2 | DIASTOLIC BLOOD PRESSURE: 64 MMHG | HEIGHT: 62 IN

## 2025-06-16 PROCEDURE — 99215 OFFICE O/P EST HI 40 MIN: CPT

## 2025-06-18 ENCOUNTER — RESULT REVIEW (OUTPATIENT)
Age: 50
End: 2025-06-18

## 2025-07-16 ENCOUNTER — APPOINTMENT (OUTPATIENT)
Dept: HEMATOLOGY ONCOLOGY | Facility: CLINIC | Age: 50
End: 2025-07-16

## 2025-07-16 ENCOUNTER — RESULT REVIEW (OUTPATIENT)
Age: 50
End: 2025-07-16

## 2025-07-16 VITALS
DIASTOLIC BLOOD PRESSURE: 70 MMHG | WEIGHT: 134.31 LBS | HEART RATE: 70 BPM | BODY MASS INDEX: 24.71 KG/M2 | HEIGHT: 62 IN | SYSTOLIC BLOOD PRESSURE: 109 MMHG | OXYGEN SATURATION: 100 % | TEMPERATURE: 97.2 F | RESPIRATION RATE: 16 BRPM

## 2025-07-16 PROCEDURE — G2211 COMPLEX E/M VISIT ADD ON: CPT | Mod: NC

## 2025-07-16 PROCEDURE — 36415 COLL VENOUS BLD VENIPUNCTURE: CPT

## 2025-07-16 PROCEDURE — 99214 OFFICE O/P EST MOD 30 MIN: CPT
